# Patient Record
Sex: FEMALE | Race: WHITE | NOT HISPANIC OR LATINO | URBAN - METROPOLITAN AREA
[De-identification: names, ages, dates, MRNs, and addresses within clinical notes are randomized per-mention and may not be internally consistent; named-entity substitution may affect disease eponyms.]

---

## 2019-11-05 ENCOUNTER — OUTPATIENT (OUTPATIENT)
Dept: OUTPATIENT SERVICES | Facility: HOSPITAL | Age: 79
LOS: 1 days | Discharge: HOME | End: 2019-11-05
Payer: MEDICARE

## 2019-11-05 VITALS
DIASTOLIC BLOOD PRESSURE: 63 MMHG | SYSTOLIC BLOOD PRESSURE: 131 MMHG | WEIGHT: 167.99 LBS | OXYGEN SATURATION: 99 % | TEMPERATURE: 97 F | HEART RATE: 69 BPM | RESPIRATION RATE: 18 BRPM | HEIGHT: 66 IN

## 2019-11-05 DIAGNOSIS — Z01.818 ENCOUNTER FOR OTHER PREPROCEDURAL EXAMINATION: ICD-10-CM

## 2019-11-05 DIAGNOSIS — Z98.49 CATARACT EXTRACTION STATUS, UNSPECIFIED EYE: Chronic | ICD-10-CM

## 2019-11-05 DIAGNOSIS — M16.11 UNILATERAL PRIMARY OSTEOARTHRITIS, RIGHT HIP: ICD-10-CM

## 2019-11-05 DIAGNOSIS — M19.90 UNSPECIFIED OSTEOARTHRITIS, UNSPECIFIED SITE: ICD-10-CM

## 2019-11-05 LAB
ANION GAP SERPL CALC-SCNC: 8 MMOL/L — SIGNIFICANT CHANGE UP (ref 5–17)
APTT BLD: 30.1 SEC — SIGNIFICANT CHANGE UP (ref 27.5–36.3)
BASOPHILS # BLD AUTO: 0.04 K/UL — SIGNIFICANT CHANGE UP (ref 0–0.2)
BASOPHILS NFR BLD AUTO: 0.6 % — SIGNIFICANT CHANGE UP (ref 0–2)
BLD GP AB SCN SERPL QL: SIGNIFICANT CHANGE UP
BUN SERPL-MCNC: 16 MG/DL — SIGNIFICANT CHANGE UP (ref 7–23)
CALCIUM SERPL-MCNC: 9.3 MG/DL — SIGNIFICANT CHANGE UP (ref 8.5–10.1)
CHLORIDE SERPL-SCNC: 104 MMOL/L — SIGNIFICANT CHANGE UP (ref 96–108)
CO2 SERPL-SCNC: 28 MMOL/L — SIGNIFICANT CHANGE UP (ref 22–31)
CREAT SERPL-MCNC: 0.68 MG/DL — SIGNIFICANT CHANGE UP (ref 0.5–1.3)
EOSINOPHIL # BLD AUTO: 0.14 K/UL — SIGNIFICANT CHANGE UP (ref 0–0.5)
EOSINOPHIL NFR BLD AUTO: 2 % — SIGNIFICANT CHANGE UP (ref 0–6)
GLUCOSE SERPL-MCNC: 79 MG/DL — SIGNIFICANT CHANGE UP (ref 70–99)
HBA1C BLD-MCNC: 4.9 % — SIGNIFICANT CHANGE UP (ref 4–5.6)
HCT VFR BLD CALC: 39 % — SIGNIFICANT CHANGE UP (ref 34.5–45)
HGB BLD-MCNC: 12.8 G/DL — SIGNIFICANT CHANGE UP (ref 11.5–15.5)
IMM GRANULOCYTES NFR BLD AUTO: 0.3 % — SIGNIFICANT CHANGE UP (ref 0–1.5)
INR BLD: 0.95 RATIO — SIGNIFICANT CHANGE UP (ref 0.88–1.16)
LYMPHOCYTES # BLD AUTO: 1.96 K/UL — SIGNIFICANT CHANGE UP (ref 1–3.3)
LYMPHOCYTES # BLD AUTO: 28.5 % — SIGNIFICANT CHANGE UP (ref 13–44)
MCHC RBC-ENTMCNC: 32.8 GM/DL — SIGNIFICANT CHANGE UP (ref 32–36)
MCHC RBC-ENTMCNC: 34.3 PG — HIGH (ref 27–34)
MCV RBC AUTO: 104.6 FL — HIGH (ref 80–100)
MONOCYTES # BLD AUTO: 0.44 K/UL — SIGNIFICANT CHANGE UP (ref 0–0.9)
MONOCYTES NFR BLD AUTO: 6.4 % — SIGNIFICANT CHANGE UP (ref 2–14)
MRSA PCR RESULT.: SIGNIFICANT CHANGE UP
NEUTROPHILS # BLD AUTO: 4.27 K/UL — SIGNIFICANT CHANGE UP (ref 1.8–7.4)
NEUTROPHILS NFR BLD AUTO: 62.2 % — SIGNIFICANT CHANGE UP (ref 43–77)
NRBC # BLD: 0 /100 WBCS — SIGNIFICANT CHANGE UP (ref 0–0)
PLATELET # BLD AUTO: 241 K/UL — SIGNIFICANT CHANGE UP (ref 150–400)
POTASSIUM SERPL-MCNC: 3.9 MMOL/L — SIGNIFICANT CHANGE UP (ref 3.5–5.3)
POTASSIUM SERPL-SCNC: 3.9 MMOL/L — SIGNIFICANT CHANGE UP (ref 3.5–5.3)
PROTHROM AB SERPL-ACNC: 10.6 SEC — SIGNIFICANT CHANGE UP (ref 10–12.9)
RBC # BLD: 3.73 M/UL — LOW (ref 3.8–5.2)
RBC # FLD: 13.4 % — SIGNIFICANT CHANGE UP (ref 10.3–14.5)
S AUREUS DNA NOSE QL NAA+PROBE: SIGNIFICANT CHANGE UP
SODIUM SERPL-SCNC: 140 MMOL/L — SIGNIFICANT CHANGE UP (ref 135–145)
WBC # BLD: 6.87 K/UL — SIGNIFICANT CHANGE UP (ref 3.8–10.5)
WBC # FLD AUTO: 6.87 K/UL — SIGNIFICANT CHANGE UP (ref 3.8–10.5)

## 2019-11-05 PROCEDURE — 93010 ELECTROCARDIOGRAM REPORT: CPT

## 2019-11-05 NOTE — H&P PST ADULT - MUSCULOSKELETAL
details… detailed exam no joint swelling/no joint erythema/decreased ROM due to pain/normal strength

## 2019-11-05 NOTE — H&P PST ADULT - NSICDXPASTMEDICALHX_GEN_ALL_CORE_FT
PAST MEDICAL HISTORY:  Chronic obstructive pulmonary disease, unspecified COPD type     History of atrial flutter     Hypothyroidism

## 2019-11-05 NOTE — H&P PST ADULT - HISTORY OF PRESENT ILLNESS
79 yr old F with PMH COPD, a flutter, resolved and hypothyroidism with R hip pain for several years, here for PST for R posterior total hip replacement.

## 2019-11-05 NOTE — PHYSICAL THERAPY INITIAL EVALUATION ADULT - MODIFIED CLINICAL TEST OF SENSORY INTEGRATION IN BALANCE TEST
5x sit to stand = 26.27 seconds. 2MWT  200ft without device (decreased step length, decreased miles, decreased arm swing). Stairs = step to step with R rail for ascent

## 2019-11-05 NOTE — PHYSICAL THERAPY INITIAL EVALUATION ADULT - PERTINENT HX OF CURRENT PROBLEM, REHAB EVAL
Pt is a 80yo F whom presents for pre-op assessment due to scheduled R TERRI (posterior approach) on 11/19

## 2019-11-05 NOTE — PHYSICAL THERAPY INITIAL EVALUATION ADULT - ADDITIONAL COMMENTS
Pt lives with her spouse (whom works but can assist after work) in a private home, 3 entry steps (+ R rail to ascend), 7 steps (R rail), landing, then additional 7 steps (R rail) to 2nd level. Pt has a tub/shower combo, no grab bars, fixed shower head, & standard toilet seat height. Pt states 3:1 commode can fit over toilet in bathroom. Pt performs all functional mobility & ADL's independently. Pt is a community ambulator. Pt denies owning any DMEs. Pt states she has no pain at rest & states pain is worse with walking (unable to use numerical pain rating scale). Pt attends outpatient pulmonary rehab 2x/week for COPD. Pt denies any home O2. Pt prefers to be called "Ermias." Pt wears eye glasses for reading & distance, drives, is right hand dominant, & works part time in real estate. Goal of therapy: improve functional mobility.

## 2019-11-18 ENCOUNTER — TRANSCRIPTION ENCOUNTER (OUTPATIENT)
Age: 79
End: 2019-11-18

## 2019-11-18 RX ORDER — OXYCODONE HYDROCHLORIDE 5 MG/1
5 TABLET ORAL EVERY 4 HOURS
Refills: 0 | Status: DISCONTINUED | OUTPATIENT
Start: 2019-11-19 | End: 2019-11-21

## 2019-11-18 RX ORDER — ASPIRIN/CALCIUM CARB/MAGNESIUM 324 MG
325 TABLET ORAL
Refills: 0 | Status: DISCONTINUED | OUTPATIENT
Start: 2019-11-20 | End: 2019-11-21

## 2019-11-18 RX ORDER — MAGNESIUM HYDROXIDE 400 MG/1
30 TABLET, CHEWABLE ORAL DAILY
Refills: 0 | Status: DISCONTINUED | OUTPATIENT
Start: 2019-11-19 | End: 2019-11-21

## 2019-11-18 RX ORDER — SODIUM CHLORIDE 9 MG/ML
1000 INJECTION INTRAMUSCULAR; INTRAVENOUS; SUBCUTANEOUS
Refills: 0 | Status: DISCONTINUED | OUTPATIENT
Start: 2019-11-19 | End: 2019-11-21

## 2019-11-18 RX ORDER — CELECOXIB 200 MG/1
200 CAPSULE ORAL DAILY
Refills: 0 | Status: DISCONTINUED | OUTPATIENT
Start: 2019-11-20 | End: 2019-11-21

## 2019-11-18 RX ORDER — PANTOPRAZOLE SODIUM 20 MG/1
40 TABLET, DELAYED RELEASE ORAL
Refills: 0 | Status: DISCONTINUED | OUTPATIENT
Start: 2019-11-19 | End: 2019-11-21

## 2019-11-18 RX ORDER — ACETAMINOPHEN 500 MG
975 TABLET ORAL EVERY 8 HOURS
Refills: 0 | Status: DISCONTINUED | OUTPATIENT
Start: 2019-11-19 | End: 2019-11-21

## 2019-11-18 RX ORDER — FERROUS SULFATE 325(65) MG
325 TABLET ORAL
Refills: 0 | Status: DISCONTINUED | OUTPATIENT
Start: 2019-11-19 | End: 2019-11-21

## 2019-11-18 RX ORDER — FOLIC ACID 0.8 MG
1 TABLET ORAL DAILY
Refills: 0 | Status: DISCONTINUED | OUTPATIENT
Start: 2019-11-19 | End: 2019-11-21

## 2019-11-18 RX ORDER — OXYCODONE HYDROCHLORIDE 5 MG/1
10 TABLET ORAL EVERY 4 HOURS
Refills: 0 | Status: DISCONTINUED | OUTPATIENT
Start: 2019-11-19 | End: 2019-11-21

## 2019-11-18 RX ORDER — POLYETHYLENE GLYCOL 3350 17 G/17G
17 POWDER, FOR SOLUTION ORAL DAILY
Refills: 0 | Status: DISCONTINUED | OUTPATIENT
Start: 2019-11-19 | End: 2019-11-21

## 2019-11-18 RX ORDER — HYDROMORPHONE HYDROCHLORIDE 2 MG/ML
0.5 INJECTION INTRAMUSCULAR; INTRAVENOUS; SUBCUTANEOUS EVERY 4 HOURS
Refills: 0 | Status: DISCONTINUED | OUTPATIENT
Start: 2019-11-19 | End: 2019-11-21

## 2019-11-18 RX ORDER — ONDANSETRON 8 MG/1
4 TABLET, FILM COATED ORAL EVERY 6 HOURS
Refills: 0 | Status: DISCONTINUED | OUTPATIENT
Start: 2019-11-19 | End: 2019-11-21

## 2019-11-18 RX ORDER — SENNA PLUS 8.6 MG/1
2 TABLET ORAL AT BEDTIME
Refills: 0 | Status: DISCONTINUED | OUTPATIENT
Start: 2019-11-19 | End: 2019-11-21

## 2019-11-19 ENCOUNTER — RESULT REVIEW (OUTPATIENT)
Age: 79
End: 2019-11-19

## 2019-11-19 ENCOUNTER — INPATIENT (INPATIENT)
Facility: HOSPITAL | Age: 79
LOS: 1 days | Discharge: HOME HEALTH | End: 2019-11-21
Attending: ORTHOPAEDIC SURGERY | Admitting: ORTHOPAEDIC SURGERY
Payer: MEDICARE

## 2019-11-19 ENCOUNTER — TRANSCRIPTION ENCOUNTER (OUTPATIENT)
Age: 79
End: 2019-11-19

## 2019-11-19 VITALS
OXYGEN SATURATION: 98 % | RESPIRATION RATE: 18 BRPM | TEMPERATURE: 97 F | DIASTOLIC BLOOD PRESSURE: 48 MMHG | HEART RATE: 76 BPM | WEIGHT: 164.91 LBS | HEIGHT: 66 IN | SYSTOLIC BLOOD PRESSURE: 134 MMHG

## 2019-11-19 DIAGNOSIS — Z98.49 CATARACT EXTRACTION STATUS, UNSPECIFIED EYE: Chronic | ICD-10-CM

## 2019-11-19 LAB
HCT VFR BLD CALC: 37.6 % — SIGNIFICANT CHANGE UP (ref 34.5–45)
HGB BLD-MCNC: 12.2 G/DL — SIGNIFICANT CHANGE UP (ref 11.5–15.5)
MCHC RBC-ENTMCNC: 32.4 GM/DL — SIGNIFICANT CHANGE UP (ref 32–36)
MCHC RBC-ENTMCNC: 34.7 PG — HIGH (ref 27–34)
MCV RBC AUTO: 106.8 FL — HIGH (ref 80–100)
NRBC # BLD: 0 /100 WBCS — SIGNIFICANT CHANGE UP (ref 0–0)
PLATELET # BLD AUTO: 193 K/UL — SIGNIFICANT CHANGE UP (ref 150–400)
RBC # BLD: 3.52 M/UL — LOW (ref 3.8–5.2)
RBC # FLD: 13.3 % — SIGNIFICANT CHANGE UP (ref 10.3–14.5)
WBC # BLD: 7.1 K/UL — SIGNIFICANT CHANGE UP (ref 3.8–10.5)
WBC # FLD AUTO: 7.1 K/UL — SIGNIFICANT CHANGE UP (ref 3.8–10.5)

## 2019-11-19 PROCEDURE — 72170 X-RAY EXAM OF PELVIS: CPT | Mod: 26,77

## 2019-11-19 PROCEDURE — 88311 DECALCIFY TISSUE: CPT | Mod: 26

## 2019-11-19 PROCEDURE — 72170 X-RAY EXAM OF PELVIS: CPT | Mod: 26

## 2019-11-19 PROCEDURE — 88305 TISSUE EXAM BY PATHOLOGIST: CPT | Mod: 26

## 2019-11-19 RX ORDER — LEVOTHYROXINE SODIUM 125 MCG
25 TABLET ORAL DAILY
Refills: 0 | Status: DISCONTINUED | OUTPATIENT
Start: 2019-11-19 | End: 2019-11-21

## 2019-11-19 RX ORDER — LANOLIN ALCOHOL/MO/W.PET/CERES
3 CREAM (GRAM) TOPICAL AT BEDTIME
Refills: 0 | Status: DISCONTINUED | OUTPATIENT
Start: 2019-11-19 | End: 2019-11-21

## 2019-11-19 RX ORDER — ONDANSETRON 8 MG/1
4 TABLET, FILM COATED ORAL ONCE
Refills: 0 | Status: DISCONTINUED | OUTPATIENT
Start: 2019-11-19 | End: 2019-11-19

## 2019-11-19 RX ORDER — ATORVASTATIN CALCIUM 80 MG/1
10 TABLET, FILM COATED ORAL AT BEDTIME
Refills: 0 | Status: DISCONTINUED | OUTPATIENT
Start: 2019-11-19 | End: 2019-11-21

## 2019-11-19 RX ORDER — CEFAZOLIN SODIUM 1 G
2000 VIAL (EA) INJECTION EVERY 8 HOURS
Refills: 0 | Status: COMPLETED | OUTPATIENT
Start: 2019-11-19 | End: 2019-11-20

## 2019-11-19 RX ORDER — OXYCODONE HYDROCHLORIDE 5 MG/1
10 TABLET ORAL ONCE
Refills: 0 | Status: DISCONTINUED | OUTPATIENT
Start: 2019-11-19 | End: 2019-11-19

## 2019-11-19 RX ORDER — DILTIAZEM HCL 120 MG
120 CAPSULE, EXT RELEASE 24 HR ORAL DAILY
Refills: 0 | Status: DISCONTINUED | OUTPATIENT
Start: 2019-11-19 | End: 2019-11-21

## 2019-11-19 RX ORDER — HYDROMORPHONE HYDROCHLORIDE 2 MG/ML
1 INJECTION INTRAMUSCULAR; INTRAVENOUS; SUBCUTANEOUS
Refills: 0 | Status: DISCONTINUED | OUTPATIENT
Start: 2019-11-19 | End: 2019-11-19

## 2019-11-19 RX ORDER — ACETAMINOPHEN 500 MG
650 TABLET ORAL ONCE
Refills: 0 | Status: COMPLETED | OUTPATIENT
Start: 2019-11-19 | End: 2019-11-19

## 2019-11-19 RX ORDER — SODIUM CHLORIDE 9 MG/ML
1000 INJECTION, SOLUTION INTRAVENOUS
Refills: 0 | Status: DISCONTINUED | OUTPATIENT
Start: 2019-11-19 | End: 2019-11-19

## 2019-11-19 RX ORDER — DEXAMETHASONE 0.5 MG/5ML
10 ELIXIR ORAL ONCE
Refills: 0 | Status: COMPLETED | OUTPATIENT
Start: 2019-11-20 | End: 2019-11-20

## 2019-11-19 RX ORDER — FLUTICASONE PROPIONATE 50 MCG
2 SPRAY, SUSPENSION NASAL DAILY
Refills: 0 | Status: DISCONTINUED | OUTPATIENT
Start: 2019-11-19 | End: 2019-11-21

## 2019-11-19 RX ORDER — HYDROMORPHONE HYDROCHLORIDE 2 MG/ML
0.5 INJECTION INTRAMUSCULAR; INTRAVENOUS; SUBCUTANEOUS
Refills: 0 | Status: DISCONTINUED | OUTPATIENT
Start: 2019-11-19 | End: 2019-11-19

## 2019-11-19 RX ORDER — TIOTROPIUM BROMIDE 18 UG/1
1 CAPSULE ORAL; RESPIRATORY (INHALATION) DAILY
Refills: 0 | Status: DISCONTINUED | OUTPATIENT
Start: 2019-11-19 | End: 2019-11-21

## 2019-11-19 RX ORDER — CELECOXIB 200 MG/1
200 CAPSULE ORAL ONCE
Refills: 0 | Status: COMPLETED | OUTPATIENT
Start: 2019-11-19 | End: 2019-11-19

## 2019-11-19 RX ADMIN — Medication 650 MILLIGRAM(S): at 11:00

## 2019-11-19 RX ADMIN — Medication 325 MILLIGRAM(S): at 18:55

## 2019-11-19 RX ADMIN — Medication 975 MILLIGRAM(S): at 23:20

## 2019-11-19 RX ADMIN — CELECOXIB 200 MILLIGRAM(S): 200 CAPSULE ORAL at 11:00

## 2019-11-19 RX ADMIN — Medication 975 MILLIGRAM(S): at 22:23

## 2019-11-19 RX ADMIN — ATORVASTATIN CALCIUM 10 MILLIGRAM(S): 80 TABLET, FILM COATED ORAL at 22:23

## 2019-11-19 RX ADMIN — SODIUM CHLORIDE 100 MILLILITER(S): 9 INJECTION INTRAMUSCULAR; INTRAVENOUS; SUBCUTANEOUS at 18:30

## 2019-11-19 RX ADMIN — Medication 100 MILLIGRAM(S): at 21:12

## 2019-11-19 NOTE — PROGRESS NOTE ADULT - SUBJECTIVE AND OBJECTIVE BOX
79yFemale s/p R TERRI POD#0. Pt seen and examined in NAD. Pain controlled. Pt denies any new complaints. Pt denies CP/SOB/N/V/D/numbness/tingling/bowel or bladder dysfunction.     PE:   RLE: dressing c/d/i. +ROM ankle/toes. Calf: soft, compressible and nontender. DP/PT 2+ NVI.                           12.2   7.10  )-----------( 193      ( 19 Nov 2019 16:14 )             37.6           A/P: 79yFemale s/p R TERRI POD#0  Pain controlled  Total hip precautions  PT: WBAT    DVT ppx: SCDs/ASA 325mg BID    Wound care, Isometric exercises, incentive spirometry   Discharge: planning for home   All the above discussed and understood by pt

## 2019-11-19 NOTE — DISCHARGE NOTE PROVIDER - CARE PROVIDER_API CALL
Antelmo Haynes)  Orthopaedic Surgery  [address] [place] [address]  Phone: [phone]  Fax: [phone]  Follow Up Time:

## 2019-11-19 NOTE — BRIEF OPERATIVE NOTE - NSICDXBRIEFPREOP_GEN_ALL_CORE_FT
PRE-OP DIAGNOSIS:  Primary localized osteoarthrosis of right hip 19-Nov-2019 15:00:51  Anjel Skinner

## 2019-11-19 NOTE — PHYSICAL THERAPY INITIAL EVALUATION ADULT - CRITERIA FOR SKILLED THERAPEUTIC INTERVENTIONS
rehab potential/anticipated equipment needs at discharge/anticipated discharge recommendation/impairments found/therapy frequency/functional limitations in following categories/predicted duration of therapy intervention/risk reduction/prevention

## 2019-11-19 NOTE — PHYSICAL THERAPY INITIAL EVALUATION ADULT - ADDITIONAL COMMENTS
Preop, confirmed and verified: Pt lives with her spouse (whom works but can assist after work) in a private home, 3 entry steps (+ R rail to ascend), 7 steps (R rail), landing, then additional 7 steps (R rail) to 2nd level. Pt has a tub/shower combo, no grab bars, fixed shower head, & standard toilet seat height. pt was independent during functional with no AD, drives.

## 2019-11-19 NOTE — DISCHARGE NOTE PROVIDER - HOSPITAL COURSE
79yFemale with history of OA presenting for R TERRI by Dr. Haynes on 11/19/19. Risk and benefits of surgery were explained to the patient. The patient understood and agreed to proceed with surgery. Patient underwent the procedure with no intraoperative complications. Pt was brought in stable condition to the PACU. Once stable in PACU, pt was brought to the floor. During hospital stay pt was followed by Medicine, physical therapy, Home Care during this admission. Pt had an uneventful hospital course. Pt is stable for discharge to home 79yFemale with history of OA presenting for R TERRI by Dr. Haynes on 11/19/19. Risk and benefits of surgery were explained to the patient. The patient understood and agreed to proceed with surgery. Patient underwent the procedure with no intraoperative complications. Pt was brought in stable condition to the PACU. Once stable in PACU, pt was brought to the floor. During hospital stay pt was followed by Medicine, physical therapy, Home Care during this admission. Pt had nausea and vomiting and required continued obeservation. She had an abdominal Xray done and it showed stool. She was given laxatives. She tolerated solid food on POD#2. Pt is stable for discharge to home on POD#2

## 2019-11-19 NOTE — PHYSICAL THERAPY INITIAL EVALUATION ADULT - GAIT TRAINING, PT EVAL
Independent in ambulation with use of cane device up to 250 feet observing proper gait pattern, posture and use of walking device safely.

## 2019-11-19 NOTE — CONSULT NOTE ADULT - SUBJECTIVE AND OBJECTIVE BOX
WHITNEY DICKINSON is a 79y Female s/p RIGHT POSTERIOR TOTAL HIP REPLACEMENT   by Dr. Haynes on 11/19/19. complains of postop pain; patient tolerated surgery well.     PMH:  w/ h/o Hypothyroidism  History of atrial flutter  Chronic obstructive pulmonary disease, unspecified COPD type    ROS: no fevers, chills, headache, dizziness, lightheadedness, chest pain, palpitations, shortness of breath, cough, phlegm, wheezing, abdominal pain, nausea, vomiting, diarrhea, constipation or urinary symptoms     PSH: H/O cataract extraction    No pertinent family history in first degree relatives    SH: does not smoke or drink at this time    No Known Allergies    MEDS: acetaminophen   Tablet .. 975 milliGRAM(s) Oral every 8 hours  aluminum hydroxide/magnesium hydroxide/simethicone Suspension 30 milliLiter(s) Oral four times a day PRN  atorvastatin 10 milliGRAM(s) Oral at bedtime  ceFAZolin   IVPB 2000 milliGRAM(s) IV Intermittent every 8 hours  diltiazem    milliGRAM(s) Oral daily  ferrous    sulfate 325 milliGRAM(s) Oral three times a day with meals  fluticasone propionate 50 MICROgram(s)/spray Nasal Spray 2 Spray(s) Both Nostrils daily  folic acid 1 milliGRAM(s) Oral daily  HYDROmorphone  Injectable 0.5 milliGRAM(s) IV Push every 4 hours PRN  HYDROmorphone  Injectable 0.5 milliGRAM(s) IV Push every 10 minutes PRN  HYDROmorphone  Injectable 1 milliGRAM(s) IV Push every 10 minutes PRN  lactated ringers. 1000 milliLiter(s) IV Continuous <Continuous>  levothyroxine 25 MICROGram(s) Oral daily  magnesium hydroxide Suspension 30 milliLiter(s) Oral daily PRN  multivitamin 1 Tablet(s) Oral daily  ondansetron Injectable 4 milliGRAM(s) IV Push every 6 hours PRN  ondansetron Injectable 4 milliGRAM(s) IV Push once PRN  oxyCODONE    IR 5 milliGRAM(s) Oral every 4 hours PRN  oxyCODONE    IR 10 milliGRAM(s) Oral every 4 hours PRN  pantoprazole    Tablet 40 milliGRAM(s) Oral before breakfast  polyethylene glycol 3350 17 Gram(s) Oral daily  senna 2 Tablet(s) Oral at bedtime PRN  sodium chloride 0.9%. 1000 milliLiter(s) IV Continuous <Continuous>  tiotropium 18 MICROgram(s) Capsule 1 Capsule(s) Inhalation daily    PHYS:  T(C): 36.4 (11-19-19 @ 18:05), Max: 36.4 (11-19-19 @ 18:05)  HR: 50 (11-19-19 @ 18:05) (41 - 76)  BP: 149/78 (11-19-19 @ 18:05) (94/62 - 149/78)  RR: 18 (11-19-19 @ 18:05) (15 - 18)  SpO2: 100% (11-19-19 @ 18:05) (97% - 100%)  HEENT unremarkable  neck no JVD or bruit  lungs, clear bilaterally  heart, regular rhythm, normal S1, S2, no murmurs, rubs or gallops  abdomen, soft, non tender, no organomegaly, normal bowel sounds  no cyanosis, clubbing, edema or calf tenderness  neuro, grossly normal                        12.2   7.10  )-----------( 193      ( 19 Nov 2019 16:14 )             37.6     Assessment and Plan: status post right total hip replacement; anemia; chronic obstructive pulmonary disease; hypothyroid; history of atrial flutter; pain control; deep vein thrombophlebitis prophylaxis; physical therapy; bowel regimen; nutrition support; follow up labs; will follow.

## 2019-11-19 NOTE — DISCHARGE NOTE PROVIDER - NSDCFUADDAPPT_GEN_ALL_CORE_FT
Follow up with Dr. Haynes in 2 weeks. Please call [phone] for appointment.      Follow up with your primary care doctor within 1 week to monitor your blood work. Please call to make an appointment.    Please call your surgeon, if you have new onset of fevers, increased drainage, increased pain or increased redness around the incision site. Please return to the Emergency Department if you have chest pain or shortness of breath.

## 2019-11-19 NOTE — PHYSICAL THERAPY INITIAL EVALUATION ADULT - RANGE OF MOTION EXAMINATION, REHAB EVAL
deficits as listed below/Left LE ROM was WNL (within normal limits)/R LE limited to 90 degrees flexion. no IR.

## 2019-11-19 NOTE — DISCHARGE NOTE PROVIDER - NSDCMRMEDTOKEN_GEN_ALL_CORE_FT
Breo Ellipta 100 mcg-25 mcg/inh inhalation powder: 1 puff(s) inhaled once a day  DilTIAZem Hydrochloride  mg/24 hours oral capsule, extended release: 1 cap(s) orally once a day  levothyroxine 25 mcg (0.025 mg) oral tablet: 1 tab(s) orally once a day  Nasonex 50 mcg/inh nasal spray: 2 spray(s) nasal once a day  NP Thyroid 30 mg oral tablet: 1 tab(s) orally 2 times a day  pravastatin 10 mg oral tablet: 1 tab(s) orally once a day  Spiriva 18 mcg inhalation capsule: 1 cap(s) inhaled once a day acetaminophen 325 mg oral tablet: 3 tab(s) orally every 8 hours as needed for pain   aspirin 325 mg oral delayed release tablet: 1 tab(s) orally every 12 hours to prevent blood clots MDD:2 tablet  bisacodyl 10 mg rectal suppository: 1 suppository(ies) rectal once a day, As needed, If no bowel movement by POD#2  Breo Ellipta 100 mcg-25 mcg/inh inhalation powder: 1 puff(s) inhaled once a day  celecoxib 200 mg oral capsule: 1 cap(s) orally once a day MDD:1 capsule  DilTIAZem Hydrochloride  mg/24 hours oral capsule, extended release: 1 cap(s) orally once a day  levothyroxine 25 mcg (0.025 mg) oral tablet: 1 tab(s) orally once a day  Multiple Vitamins oral tablet: 1 tab(s) orally once a day  Nasonex 50 mcg/inh nasal spray: 2 spray(s) nasal once a day  NP Thyroid 30 mg oral tablet: 1 tab(s) orally 2 times a day  pantoprazole 40 mg oral delayed release tablet: 1 tab(s) orally once a day (before a meal) MDD:1 tablet  polyethylene glycol 3350 oral powder for reconstitution: 17 gram(s) orally once a day  pravastatin 10 mg oral tablet: 1 tab(s) orally once a day  Spiriva 18 mcg inhalation capsule: 1 cap(s) inhaled once a day acetaminophen 325 mg oral tablet: 3 tab(s) orally every 8 hours as needed for pain   aspirin 325 mg oral delayed release tablet: 1 tab(s) orally every 12 hours to prevent blood clots MDD:2 tablet  bisacodyl 10 mg rectal suppository: 1 suppository(ies) rectal once a day, As needed, If no bowel movement by POD#2  Breo Ellipta 100 mcg-25 mcg/inh inhalation powder: 1 puff(s) inhaled once a day  celecoxib 200 mg oral capsule: 1 cap(s) orally once a day MDD:1 capsule  DilTIAZem Hydrochloride  mg/24 hours oral capsule, extended release: 1 cap(s) orally once a day  levothyroxine 25 mcg (0.025 mg) oral tablet: 1 tab(s) orally once a day  magnesium hydroxide 8% oral suspension: 30 milliliter(s) orally once a day, As needed, Constipation  Multiple Vitamins oral tablet: 1 tab(s) orally once a day  Nasonex 50 mcg/inh nasal spray: 2 spray(s) nasal once a day  NP Thyroid 30 mg oral tablet: 1 tab(s) orally 2 times a day  pantoprazole 40 mg oral delayed release tablet: 1 tab(s) orally once a day (before a meal) MDD:1 tablet  polyethylene glycol 3350 oral powder for reconstitution: 17 gram(s) orally once a day  pravastatin 10 mg oral tablet: 1 tab(s) orally once a day  Spiriva 18 mcg inhalation capsule: 1 cap(s) inhaled once a day

## 2019-11-19 NOTE — DISCHARGE NOTE PROVIDER - NSDCACTIVITY_GEN_ALL_CORE
Walking - Outdoors allowed/Do not drive or operate machinery/Stairs allowed/Showering allowed/Do not make important decisions/Walking - Indoors allowed/No heavy lifting/straining

## 2019-11-20 ENCOUNTER — TRANSCRIPTION ENCOUNTER (OUTPATIENT)
Age: 79
End: 2019-11-20

## 2019-11-20 LAB
ANION GAP SERPL CALC-SCNC: 6 MMOL/L — SIGNIFICANT CHANGE UP (ref 5–17)
BUN SERPL-MCNC: 22 MG/DL — SIGNIFICANT CHANGE UP (ref 7–23)
CALCIUM SERPL-MCNC: 8.6 MG/DL — SIGNIFICANT CHANGE UP (ref 8.5–10.1)
CHLORIDE SERPL-SCNC: 110 MMOL/L — HIGH (ref 96–108)
CO2 SERPL-SCNC: 24 MMOL/L — SIGNIFICANT CHANGE UP (ref 22–31)
CREAT SERPL-MCNC: 0.8 MG/DL — SIGNIFICANT CHANGE UP (ref 0.5–1.3)
GLUCOSE SERPL-MCNC: 132 MG/DL — HIGH (ref 70–99)
HCT VFR BLD CALC: 34.9 % — SIGNIFICANT CHANGE UP (ref 34.5–45)
HGB BLD-MCNC: 11.5 G/DL — SIGNIFICANT CHANGE UP (ref 11.5–15.5)
MCHC RBC-ENTMCNC: 33 GM/DL — SIGNIFICANT CHANGE UP (ref 32–36)
MCHC RBC-ENTMCNC: 34.4 PG — HIGH (ref 27–34)
MCV RBC AUTO: 104.5 FL — HIGH (ref 80–100)
NRBC # BLD: 0 /100 WBCS — SIGNIFICANT CHANGE UP (ref 0–0)
PLATELET # BLD AUTO: 192 K/UL — SIGNIFICANT CHANGE UP (ref 150–400)
POTASSIUM SERPL-MCNC: 4.1 MMOL/L — SIGNIFICANT CHANGE UP (ref 3.5–5.3)
POTASSIUM SERPL-SCNC: 4.1 MMOL/L — SIGNIFICANT CHANGE UP (ref 3.5–5.3)
RBC # BLD: 3.34 M/UL — LOW (ref 3.8–5.2)
RBC # FLD: 12.9 % — SIGNIFICANT CHANGE UP (ref 10.3–14.5)
SODIUM SERPL-SCNC: 140 MMOL/L — SIGNIFICANT CHANGE UP (ref 135–145)
WBC # BLD: 9.92 K/UL — SIGNIFICANT CHANGE UP (ref 3.8–10.5)
WBC # FLD AUTO: 9.92 K/UL — SIGNIFICANT CHANGE UP (ref 3.8–10.5)

## 2019-11-20 RX ORDER — SIMETHICONE 80 MG/1
80 TABLET, CHEWABLE ORAL EVERY 6 HOURS
Refills: 0 | Status: COMPLETED | OUTPATIENT
Start: 2019-11-20 | End: 2019-11-21

## 2019-11-20 RX ORDER — ACETAMINOPHEN 500 MG
3 TABLET ORAL
Qty: 0 | Refills: 0 | DISCHARGE
Start: 2019-11-20

## 2019-11-20 RX ORDER — POLYETHYLENE GLYCOL 3350 17 G/17G
17 POWDER, FOR SOLUTION ORAL
Qty: 0 | Refills: 0 | DISCHARGE
Start: 2019-11-20

## 2019-11-20 RX ORDER — CELECOXIB 200 MG/1
1 CAPSULE ORAL
Qty: 30 | Refills: 0
Start: 2019-11-20 | End: 2019-12-19

## 2019-11-20 RX ORDER — METOCLOPRAMIDE HCL 10 MG
10 TABLET ORAL ONCE
Refills: 0 | Status: COMPLETED | OUTPATIENT
Start: 2019-11-20 | End: 2019-11-20

## 2019-11-20 RX ORDER — ASPIRIN/CALCIUM CARB/MAGNESIUM 324 MG
1 TABLET ORAL
Qty: 60 | Refills: 0
Start: 2019-11-20 | End: 2019-12-19

## 2019-11-20 RX ORDER — SODIUM CHLORIDE 9 MG/ML
1000 INJECTION, SOLUTION INTRAVENOUS
Refills: 0 | Status: COMPLETED | OUTPATIENT
Start: 2019-11-20 | End: 2019-11-20

## 2019-11-20 RX ORDER — PANTOPRAZOLE SODIUM 20 MG/1
1 TABLET, DELAYED RELEASE ORAL
Qty: 30 | Refills: 0
Start: 2019-11-20 | End: 2019-12-19

## 2019-11-20 RX ADMIN — ATORVASTATIN CALCIUM 10 MILLIGRAM(S): 80 TABLET, FILM COATED ORAL at 21:55

## 2019-11-20 RX ADMIN — Medication 10 MILLIGRAM(S): at 17:17

## 2019-11-20 RX ADMIN — TIOTROPIUM BROMIDE 1 CAPSULE(S): 18 CAPSULE ORAL; RESPIRATORY (INHALATION) at 14:36

## 2019-11-20 RX ADMIN — ONDANSETRON 4 MILLIGRAM(S): 8 TABLET, FILM COATED ORAL at 08:06

## 2019-11-20 RX ADMIN — Medication 325 MILLIGRAM(S): at 17:18

## 2019-11-20 RX ADMIN — POLYETHYLENE GLYCOL 3350 17 GRAM(S): 17 POWDER, FOR SOLUTION ORAL at 12:51

## 2019-11-20 RX ADMIN — Medication 2 SPRAY(S): at 14:36

## 2019-11-20 RX ADMIN — Medication 325 MILLIGRAM(S): at 06:33

## 2019-11-20 RX ADMIN — Medication 975 MILLIGRAM(S): at 14:37

## 2019-11-20 RX ADMIN — SODIUM CHLORIDE 100 MILLILITER(S): 9 INJECTION INTRAMUSCULAR; INTRAVENOUS; SUBCUTANEOUS at 05:44

## 2019-11-20 RX ADMIN — Medication 102 MILLIGRAM(S): at 06:32

## 2019-11-20 RX ADMIN — Medication 25 MICROGRAM(S): at 06:33

## 2019-11-20 RX ADMIN — Medication 325 MILLIGRAM(S): at 12:51

## 2019-11-20 RX ADMIN — CELECOXIB 200 MILLIGRAM(S): 200 CAPSULE ORAL at 12:53

## 2019-11-20 RX ADMIN — Medication 1 TABLET(S): at 12:50

## 2019-11-20 RX ADMIN — Medication 3 MILLIGRAM(S): at 00:22

## 2019-11-20 RX ADMIN — Medication 120 MILLIGRAM(S): at 12:50

## 2019-11-20 RX ADMIN — CELECOXIB 200 MILLIGRAM(S): 200 CAPSULE ORAL at 13:31

## 2019-11-20 RX ADMIN — Medication 975 MILLIGRAM(S): at 06:33

## 2019-11-20 RX ADMIN — Medication 100 MILLIGRAM(S): at 05:45

## 2019-11-20 RX ADMIN — Medication 975 MILLIGRAM(S): at 07:30

## 2019-11-20 RX ADMIN — SIMETHICONE 80 MILLIGRAM(S): 80 TABLET, CHEWABLE ORAL at 17:17

## 2019-11-20 RX ADMIN — Medication 325 MILLIGRAM(S): at 08:04

## 2019-11-20 RX ADMIN — Medication 325 MILLIGRAM(S): at 17:39

## 2019-11-20 RX ADMIN — Medication 975 MILLIGRAM(S): at 21:56

## 2019-11-20 RX ADMIN — SODIUM CHLORIDE 500 MILLILITER(S): 9 INJECTION, SOLUTION INTRAVENOUS at 12:37

## 2019-11-20 RX ADMIN — Medication 975 MILLIGRAM(S): at 22:56

## 2019-11-20 RX ADMIN — PANTOPRAZOLE SODIUM 40 MILLIGRAM(S): 20 TABLET, DELAYED RELEASE ORAL at 08:04

## 2019-11-20 RX ADMIN — Medication 1 MILLIGRAM(S): at 12:50

## 2019-11-20 RX ADMIN — SIMETHICONE 80 MILLIGRAM(S): 80 TABLET, CHEWABLE ORAL at 21:57

## 2019-11-20 RX ADMIN — Medication 975 MILLIGRAM(S): at 15:10

## 2019-11-20 RX ADMIN — Medication 3 MILLIGRAM(S): at 21:56

## 2019-11-20 NOTE — OCCUPATIONAL THERAPY INITIAL EVALUATION ADULT - SOCIAL CONCERNS
Pt voiced concerns about her recovery at home. Pt has her spouse to assist her after discharge home/Complex psychosocial needs/coping issues

## 2019-11-20 NOTE — PROGRESS NOTE ADULT - SUBJECTIVE AND OBJECTIVE BOX
79yFemale s/p R TERRI POD#1. Pt seen and examined in NAD. Pain controlled. Pt denies any new complaints. Pt denies CP/SOB/N/V/D/numbness/tingling/bowel or bladder dysfunction.     PE:   RLE: dressing c/d/i. +ROM ankle/toes. Calf: soft, compressible and nontender. DP/PT 2+ NVI.                           11.5   9.92  )-----------( 192      ( 20 Nov 2019 06:34 )             34.9       11-20    140  |  110<H>  |  22  ----------------------------<  132<H>  4.1   |  24  |  0.80    Ca    8.6      20 Nov 2019 06:34          A/P: 79yFemale s/p R TERRI POD#1  Pain controlled  Total hip precautions  PT: WBAT  DVT ppx: SCDs/ASA 325mg BID    Wound care, Isometric exercises, incentive spirometry   Discharge: planning for home   All the above discussed and understood by pt

## 2019-11-20 NOTE — OCCUPATIONAL THERAPY INITIAL EVALUATION ADULT - PARENT CAREGIVER TRAINING, OT EVAL
Pt will state all posterior hip precautions and safely incorporate them with ADL and  functional mobility .

## 2019-11-20 NOTE — OCCUPATIONAL THERAPY INITIAL EVALUATION ADULT - GENERAL OBSERVATIONS, REHAB EVAL
Pt was seen for initial OT consult, encountered in NAD. IV was disconnected by JAVAN Mcgee. Posterioir THP were reviewed & maintained. Pt was AA&Ox4, cooperative & followed commands. Pt denied pain despite  to s/p THR. Pt presents with decreased activity tolerance ,balance, ADL management, ROM, muscle strength and functional mobility.

## 2019-11-20 NOTE — OCCUPATIONAL THERAPY INITIAL EVALUATION ADULT - PERSONAL SAFETY AND JUDGMENT, REHAB EVAL
intact/however, pt needs reminders to safely incorporate THP with ADL., functional mobility and to avoid internal rotation of right hip during turns.

## 2019-11-20 NOTE — OCCUPATIONAL THERAPY INITIAL EVALUATION ADULT - LIVES WITH, PROFILE
spouse/in a private house with 3 entry steps equipped with right hand rail. Once inside, pt has to negotiate a flight of stairs  ( 7 steps + a landing and another 7 steps with right hand rail) to access the bedroom and bathroom. The bathroom has a tub/shower combination, fixed shower head and standard toilet.

## 2019-11-20 NOTE — OCCUPATIONAL THERAPY INITIAL EVALUATION ADULT - ANTICIPATED DISCHARGE DISPOSITION, OT EVAL
Recommend home with OT referral to enable patient to safely perform ADL management and functional mobility. Pt conformed she has received a h 3-in-1 commode and rolling walker.

## 2019-11-20 NOTE — DISCHARGE NOTE NURSING/CASE MANAGEMENT/SOCIAL WORK - PATIENT PORTAL LINK FT
You can access the FollowMyHealth Patient Portal offered by [facility] by registering at the following website: [email or web address]. By joining [facility]’s FollowMyHealth portal, you will also be able to view your health information using other applications (apps) compatible with our system.

## 2019-11-20 NOTE — OCCUPATIONAL THERAPY INITIAL EVALUATION ADULT - ADL RETRAINING, OT EVAL
Pt will perform all aspects of lower body self care tasks independently with use of adaptive device /set up within 1 week while adhering to all posterior hip precautions .

## 2019-11-20 NOTE — OCCUPATIONAL THERAPY INITIAL EVALUATION ADULT - PLANNED THERAPY INTERVENTIONS, OT EVAL
IADL retraining/balance training/ROM/parent/caregiver training.../strengthening/stretching/transfer training/energy conservation techniques/ADL retraining/bed mobility training

## 2019-11-20 NOTE — OCCUPATIONAL THERAPY INITIAL EVALUATION ADULT - ADDITIONAL COMMENTS
Prior to admission, Pt was functioning in her roles, driving , self sufficient & ambulating independently without any assistive devices. Presently pt needs assistance with lower body self care tasks due to pain, weakness, stiffness and decreased ROM from posterior right THR. . Pt is right hand dominant and wears glasses for reading.

## 2019-11-20 NOTE — PROGRESS NOTE ADULT - SUBJECTIVE AND OBJECTIVE BOX
WHITNEY DICKINSON is a 79y Female s/p RIGHT POSTERIOR TOTAL HIP REPLACEMENT  by Dr. Haynes on 11/19/19. complains of postop pain; patient tolerated surgery well.    ROS; no pulmonary, cardiovascular, gastrointestinal, urological or neurological symptoms.     PHYS: T(C): 36.6 (11-20-19 @ 04:30), Max: 36.8 (11-19-19 @ 20:34)  HR: 53 (11-20-19 @ 04:30) (41 - 76)  BP: 146/67 (11-20-19 @ 04:30) (94/62 - 149/78)  RR: 16 (11-20-19 @ 04:30) (15 - 18)  SpO2: 98% (11-20-19 @ 04:30) (96% - 100%)  vss; lungs, clear; heart, reg rhythm, no murmurs, rubs or gallops;   abd, soft, non tender, normal bowel sounds, no calf tenderness or edema                         11.5   9.92  )-----------( 192      ( 20 Nov 2019 06:34 )             34.9   11-20    140  |  110<H>  |  22  ----------------------------<  132<H>  4.1   |  24  |  0.80    Ca    8.6      20 Nov 2019 06:34      Assessment and Plan: status post right total hip replacement; chronic obstructive pulmonary disease; hypothyroid; random elevated glucose; history of atrial flutter; pain control; deep vein thrombophlebitis prophylaxis; physical therapy; bowel regimen; nutrition support; follow up labs; will follow.

## 2019-11-20 NOTE — DISCHARGE NOTE NURSING/CASE MANAGEMENT/SOCIAL WORK - NSDCFUADDAPPT_GEN_ALL_CORE_FT
Follow up with Dr. Haynes in 2 weeks. Please call [phone] for appointment.      Follow up with your primary care doctor within 1 week to monitor your blood work. Please call to make an appointment.    Please call your surgeon, if you have new onset of fevers, increased drainage, increased pain or increased redness around the incision site. Please return to the Emergency Department if you have chest pain or shortness of breath.

## 2019-11-21 VITALS
OXYGEN SATURATION: 97 % | RESPIRATION RATE: 17 BRPM | HEART RATE: 66 BPM | DIASTOLIC BLOOD PRESSURE: 65 MMHG | TEMPERATURE: 99 F | SYSTOLIC BLOOD PRESSURE: 138 MMHG

## 2019-11-21 LAB
ANION GAP SERPL CALC-SCNC: 7 MMOL/L — SIGNIFICANT CHANGE UP (ref 5–17)
BUN SERPL-MCNC: 13 MG/DL — SIGNIFICANT CHANGE UP (ref 7–23)
CALCIUM SERPL-MCNC: 8.4 MG/DL — LOW (ref 8.5–10.1)
CHLORIDE SERPL-SCNC: 97 MMOL/L — SIGNIFICANT CHANGE UP (ref 96–108)
CO2 SERPL-SCNC: 28 MMOL/L — SIGNIFICANT CHANGE UP (ref 22–31)
CREAT SERPL-MCNC: 0.56 MG/DL — SIGNIFICANT CHANGE UP (ref 0.5–1.3)
GLUCOSE SERPL-MCNC: 111 MG/DL — HIGH (ref 70–99)
HCT VFR BLD CALC: 34.3 % — LOW (ref 34.5–45)
HGB BLD-MCNC: 11.8 G/DL — SIGNIFICANT CHANGE UP (ref 11.5–15.5)
MCHC RBC-ENTMCNC: 34.4 GM/DL — SIGNIFICANT CHANGE UP (ref 32–36)
MCHC RBC-ENTMCNC: 34.5 PG — HIGH (ref 27–34)
MCV RBC AUTO: 100.3 FL — HIGH (ref 80–100)
NRBC # BLD: 0 /100 WBCS — SIGNIFICANT CHANGE UP (ref 0–0)
PLATELET # BLD AUTO: 218 K/UL — SIGNIFICANT CHANGE UP (ref 150–400)
POTASSIUM SERPL-MCNC: 3.8 MMOL/L — SIGNIFICANT CHANGE UP (ref 3.5–5.3)
POTASSIUM SERPL-SCNC: 3.8 MMOL/L — SIGNIFICANT CHANGE UP (ref 3.5–5.3)
RBC # BLD: 3.42 M/UL — LOW (ref 3.8–5.2)
RBC # FLD: 12.9 % — SIGNIFICANT CHANGE UP (ref 10.3–14.5)
SODIUM SERPL-SCNC: 132 MMOL/L — LOW (ref 135–145)
WBC # BLD: 13.75 K/UL — HIGH (ref 3.8–10.5)
WBC # FLD AUTO: 13.75 K/UL — HIGH (ref 3.8–10.5)

## 2019-11-21 PROCEDURE — 74018 RADEX ABDOMEN 1 VIEW: CPT | Mod: 26

## 2019-11-21 RX ORDER — SODIUM CHLORIDE 9 MG/ML
1000 INJECTION, SOLUTION INTRAVENOUS
Refills: 0 | Status: DISCONTINUED | OUTPATIENT
Start: 2019-11-21 | End: 2019-11-21

## 2019-11-21 RX ORDER — MAGNESIUM HYDROXIDE 400 MG/1
30 TABLET, CHEWABLE ORAL
Qty: 0 | Refills: 0 | DISCHARGE
Start: 2019-11-21

## 2019-11-21 RX ORDER — METOCLOPRAMIDE HCL 10 MG
10 TABLET ORAL EVERY 6 HOURS
Refills: 0 | Status: DISCONTINUED | OUTPATIENT
Start: 2019-11-21 | End: 2019-11-21

## 2019-11-21 RX ORDER — LACTULOSE 10 G/15ML
20 SOLUTION ORAL
Refills: 0 | Status: DISCONTINUED | OUTPATIENT
Start: 2019-11-21 | End: 2019-11-21

## 2019-11-21 RX ADMIN — CELECOXIB 200 MILLIGRAM(S): 200 CAPSULE ORAL at 13:04

## 2019-11-21 RX ADMIN — Medication 10 MILLIGRAM(S): at 09:26

## 2019-11-21 RX ADMIN — Medication 975 MILLIGRAM(S): at 06:37

## 2019-11-21 RX ADMIN — TIOTROPIUM BROMIDE 1 CAPSULE(S): 18 CAPSULE ORAL; RESPIRATORY (INHALATION) at 12:06

## 2019-11-21 RX ADMIN — LACTULOSE 20 GRAM(S): 10 SOLUTION ORAL at 13:59

## 2019-11-21 RX ADMIN — Medication 120 MILLIGRAM(S): at 05:42

## 2019-11-21 RX ADMIN — Medication 2 SPRAY(S): at 12:04

## 2019-11-21 RX ADMIN — CELECOXIB 200 MILLIGRAM(S): 200 CAPSULE ORAL at 12:04

## 2019-11-21 RX ADMIN — Medication 325 MILLIGRAM(S): at 05:45

## 2019-11-21 RX ADMIN — SIMETHICONE 80 MILLIGRAM(S): 80 TABLET, CHEWABLE ORAL at 05:43

## 2019-11-21 RX ADMIN — Medication 975 MILLIGRAM(S): at 05:42

## 2019-11-21 RX ADMIN — Medication 975 MILLIGRAM(S): at 13:59

## 2019-11-21 RX ADMIN — Medication 975 MILLIGRAM(S): at 14:59

## 2019-11-21 RX ADMIN — Medication 25 MICROGRAM(S): at 05:42

## 2019-11-21 RX ADMIN — Medication 10 MILLIGRAM(S): at 14:08

## 2019-11-21 RX ADMIN — POLYETHYLENE GLYCOL 3350 17 GRAM(S): 17 POWDER, FOR SOLUTION ORAL at 12:04

## 2019-11-21 RX ADMIN — ONDANSETRON 4 MILLIGRAM(S): 8 TABLET, FILM COATED ORAL at 05:47

## 2019-11-21 RX ADMIN — SODIUM CHLORIDE 75 MILLILITER(S): 9 INJECTION, SOLUTION INTRAVENOUS at 09:25

## 2019-11-21 NOTE — PROGRESS NOTE ADULT - SUBJECTIVE AND OBJECTIVE BOX
Patient is seen and examined at bedside. Denies CP/SOB/Dizziness/N/V/D/HA. Pain is controlled. Ate jello for breakfast but has no appetite. No other PO intake since OR. No flatus.     Vital Signs Last 24 Hrs  T(C): 36.9 (21 Nov 2019 05:45), Max: 37.1 (20 Nov 2019 21:28)  T(F): 98.5 (21 Nov 2019 05:45), Max: 98.8 (20 Nov 2019 21:28)  HR: 69 (21 Nov 2019 10:13) (58 - 89)  BP: 126/62 (21 Nov 2019 10:13) (126/62 - 195/74)  BP(mean): --  RR: 17 (21 Nov 2019 10:13) (16 - 19)  SpO2: 96% (21 Nov 2019 10:13) (96% - 98%)    GEN: NAD  ABD: soft, NT/ND; no rebound or guarding;  Neurologic: AAOx3; CNS grossly intact; no focal deficits  RLE: Prineo Dressing C/D/I. abduction pillow in place  B/L LE's: Motor intact + EHL/FHL/TA/GS in the BL LE. Sensation is grossly intact B/L. Extremities warm B/L. Compartments soft, compressible B/L, no calf tenderness B/L. DP 2+ B/L.    Labs:                          11.8   13.75 )-----------( 218      ( 21 Nov 2019 06:26 )             34.3       11-21    132<L>  |  97  |  13  ----------------------------<  111<H>  3.8   |  28  |  0.56    Ca    8.4<L>      21 Nov 2019 06:26        A/P: Patient is a 79y y/o Female s/p right total hip arthroplasty, POD # 2  -wound care, isometric exercises, GI motility, new medications, hip precautions,  hospital course and discharge planning reviewed with pt  -Pain control/analgesia  -Inc spirometry reviewed and counseled  -Venodynes/foot pumps  -Hyponatremia: Likely from decreased PO intake. Monitor Na- encourage increased PO intake  -PT/OT/WBAT  -Poor PO intake: F/U AXR.  -Will follow.

## 2019-11-21 NOTE — PROGRESS NOTE ADULT - SUBJECTIVE AND OBJECTIVE BOX
WHITNEY DICKINSON is a 79y Female s/p RIGHT POSTERIOR TOTAL HIP REPLACEMENT  by Dr. Haynes on 11/19/19. patient has mild post op pain.    ROS: no pulmonary, cardiovascular, gastrointestinal or urological symptoms     PHYS: T(C): 36.9 (11-21-19 @ 05:45), Max: 37.1 (11-20-19 @ 21:28)  HR: 89 (11-21-19 @ 05:45) (54 - 89)  BP: 165/72 (11-21-19 @ 06:55) (162/68 - 195/74)  RR: 16 (11-21-19 @ 05:45) (15 - 19)  SpO2: 96% (11-21-19 @ 05:45) (96% - 98%)  vss; lungs, clear, heart, reg rhythm, abd, soft non tender, no calf tenderness                         11.8   13.75 )-----------( 218      ( 21 Nov 2019 06:26 )             34.3   11-21    132<L>  |  97  |  13  ----------------------------<  111<H>  3.8   |  28  |  0.56    Ca    8.4<L>      21 Nov 2019 06:26    Assessment and Plan: status post right total hip replacement; chronic obstructive pulmonary disease; hypothyroid; history of atrial flutter; postop leucocytosis postop anemia; random elevated glucose; hyponatremia; pain control; deep vein thrombophlebitis prophylaxis; physical therapy; bowel regimen; nutrition support; follow up labs; will follow.

## 2019-11-22 LAB — SURGICAL PATHOLOGY STUDY: SIGNIFICANT CHANGE UP

## 2019-11-25 DIAGNOSIS — E87.1 HYPO-OSMOLALITY AND HYPONATREMIA: ICD-10-CM

## 2019-11-25 DIAGNOSIS — M16.11 UNILATERAL PRIMARY OSTEOARTHRITIS, RIGHT HIP: ICD-10-CM

## 2019-11-25 DIAGNOSIS — E03.9 HYPOTHYROIDISM, UNSPECIFIED: ICD-10-CM

## 2019-11-25 DIAGNOSIS — J44.9 CHRONIC OBSTRUCTIVE PULMONARY DISEASE, UNSPECIFIED: ICD-10-CM

## 2019-12-13 PROBLEM — J44.9 CHRONIC OBSTRUCTIVE PULMONARY DISEASE, UNSPECIFIED: Chronic | Status: ACTIVE | Noted: 2019-11-05

## 2019-12-13 PROBLEM — E03.9 HYPOTHYROIDISM, UNSPECIFIED: Chronic | Status: ACTIVE | Noted: 2019-11-05

## 2019-12-16 ENCOUNTER — TRANSCRIPTION ENCOUNTER (OUTPATIENT)
Age: 79
End: 2019-12-16

## 2019-12-16 RX ORDER — FOLIC ACID 0.8 MG
1 TABLET ORAL DAILY
Refills: 0 | Status: DISCONTINUED | OUTPATIENT
Start: 2019-12-17 | End: 2019-12-19

## 2019-12-16 RX ORDER — SENNA PLUS 8.6 MG/1
2 TABLET ORAL AT BEDTIME
Refills: 0 | Status: DISCONTINUED | OUTPATIENT
Start: 2019-12-17 | End: 2019-12-19

## 2019-12-16 RX ORDER — PANTOPRAZOLE SODIUM 20 MG/1
40 TABLET, DELAYED RELEASE ORAL
Refills: 0 | Status: DISCONTINUED | OUTPATIENT
Start: 2019-12-17 | End: 2019-12-19

## 2019-12-16 RX ORDER — ACETAMINOPHEN 500 MG
975 TABLET ORAL EVERY 8 HOURS
Refills: 0 | Status: DISCONTINUED | OUTPATIENT
Start: 2019-12-17 | End: 2019-12-19

## 2019-12-16 RX ORDER — ASPIRIN/CALCIUM CARB/MAGNESIUM 324 MG
325 TABLET ORAL
Refills: 0 | Status: DISCONTINUED | OUTPATIENT
Start: 2019-12-18 | End: 2019-12-19

## 2019-12-16 RX ORDER — SODIUM CHLORIDE 9 MG/ML
1000 INJECTION INTRAMUSCULAR; INTRAVENOUS; SUBCUTANEOUS
Refills: 0 | Status: DISCONTINUED | OUTPATIENT
Start: 2019-12-17 | End: 2019-12-19

## 2019-12-16 RX ORDER — MAGNESIUM HYDROXIDE 400 MG/1
30 TABLET, CHEWABLE ORAL DAILY
Refills: 0 | Status: DISCONTINUED | OUTPATIENT
Start: 2019-12-17 | End: 2019-12-19

## 2019-12-16 RX ORDER — CELECOXIB 200 MG/1
200 CAPSULE ORAL DAILY
Refills: 0 | Status: DISCONTINUED | OUTPATIENT
Start: 2019-12-18 | End: 2019-12-19

## 2019-12-16 RX ORDER — POLYETHYLENE GLYCOL 3350 17 G/17G
17 POWDER, FOR SOLUTION ORAL DAILY
Refills: 0 | Status: DISCONTINUED | OUTPATIENT
Start: 2019-12-17 | End: 2019-12-19

## 2019-12-16 RX ORDER — HYDROMORPHONE HYDROCHLORIDE 2 MG/ML
0.5 INJECTION INTRAMUSCULAR; INTRAVENOUS; SUBCUTANEOUS EVERY 4 HOURS
Refills: 0 | Status: DISCONTINUED | OUTPATIENT
Start: 2019-12-17 | End: 2019-12-19

## 2019-12-16 RX ORDER — OXYCODONE HYDROCHLORIDE 5 MG/1
5 TABLET ORAL EVERY 4 HOURS
Refills: 0 | Status: DISCONTINUED | OUTPATIENT
Start: 2019-12-17 | End: 2019-12-19

## 2019-12-16 RX ORDER — FERROUS SULFATE 325(65) MG
325 TABLET ORAL
Refills: 0 | Status: DISCONTINUED | OUTPATIENT
Start: 2019-12-17 | End: 2019-12-19

## 2019-12-16 RX ORDER — ONDANSETRON 8 MG/1
4 TABLET, FILM COATED ORAL EVERY 6 HOURS
Refills: 0 | Status: DISCONTINUED | OUTPATIENT
Start: 2019-12-17 | End: 2019-12-19

## 2019-12-16 RX ORDER — OXYCODONE HYDROCHLORIDE 5 MG/1
10 TABLET ORAL EVERY 4 HOURS
Refills: 0 | Status: DISCONTINUED | OUTPATIENT
Start: 2019-12-17 | End: 2019-12-19

## 2019-12-17 ENCOUNTER — TRANSCRIPTION ENCOUNTER (OUTPATIENT)
Age: 79
End: 2019-12-17

## 2019-12-17 ENCOUNTER — INPATIENT (INPATIENT)
Facility: HOSPITAL | Age: 79
LOS: 1 days | Discharge: NURSING, REHAB OR LONG-TERM CARE | End: 2019-12-19
Attending: ORTHOPAEDIC SURGERY | Admitting: ORTHOPAEDIC SURGERY
Payer: MEDICARE

## 2019-12-17 VITALS
OXYGEN SATURATION: 97 % | RESPIRATION RATE: 18 BRPM | WEIGHT: 164.91 LBS | SYSTOLIC BLOOD PRESSURE: 144 MMHG | TEMPERATURE: 98 F | HEIGHT: 66 IN | DIASTOLIC BLOOD PRESSURE: 57 MMHG

## 2019-12-17 DIAGNOSIS — Z98.49 CATARACT EXTRACTION STATUS, UNSPECIFIED EYE: Chronic | ICD-10-CM

## 2019-12-17 LAB
ANION GAP SERPL CALC-SCNC: 6 MMOL/L — SIGNIFICANT CHANGE UP (ref 5–17)
BUN SERPL-MCNC: 19 MG/DL — SIGNIFICANT CHANGE UP (ref 7–23)
CALCIUM SERPL-MCNC: 9.4 MG/DL — SIGNIFICANT CHANGE UP (ref 8.5–10.1)
CHLORIDE SERPL-SCNC: 110 MMOL/L — HIGH (ref 96–108)
CO2 SERPL-SCNC: 28 MMOL/L — SIGNIFICANT CHANGE UP (ref 22–31)
CREAT SERPL-MCNC: 0.73 MG/DL — SIGNIFICANT CHANGE UP (ref 0.5–1.3)
GLUCOSE SERPL-MCNC: 91 MG/DL — SIGNIFICANT CHANGE UP (ref 70–99)
HCT VFR BLD CALC: 31.6 % — LOW (ref 34.5–45)
HCT VFR BLD CALC: 35.6 % — SIGNIFICANT CHANGE UP (ref 34.5–45)
HGB BLD-MCNC: 10 G/DL — LOW (ref 11.5–15.5)
HGB BLD-MCNC: 11.5 G/DL — SIGNIFICANT CHANGE UP (ref 11.5–15.5)
MCHC RBC-ENTMCNC: 31.6 GM/DL — LOW (ref 32–36)
MCHC RBC-ENTMCNC: 32.3 GM/DL — SIGNIFICANT CHANGE UP (ref 32–36)
MCHC RBC-ENTMCNC: 34.7 PG — HIGH (ref 27–34)
MCHC RBC-ENTMCNC: 35 PG — HIGH (ref 27–34)
MCV RBC AUTO: 108.2 FL — HIGH (ref 80–100)
MCV RBC AUTO: 109.7 FL — HIGH (ref 80–100)
NRBC # BLD: 0 /100 WBCS — SIGNIFICANT CHANGE UP (ref 0–0)
NRBC # BLD: 0 /100 WBCS — SIGNIFICANT CHANGE UP (ref 0–0)
PLATELET # BLD AUTO: 209 K/UL — SIGNIFICANT CHANGE UP (ref 150–400)
PLATELET # BLD AUTO: 236 K/UL — SIGNIFICANT CHANGE UP (ref 150–400)
POTASSIUM SERPL-MCNC: 4.4 MMOL/L — SIGNIFICANT CHANGE UP (ref 3.5–5.3)
POTASSIUM SERPL-SCNC: 4.4 MMOL/L — SIGNIFICANT CHANGE UP (ref 3.5–5.3)
RBC # BLD: 2.88 M/UL — LOW (ref 3.8–5.2)
RBC # BLD: 3.29 M/UL — LOW (ref 3.8–5.2)
RBC # FLD: 14.2 % — SIGNIFICANT CHANGE UP (ref 10.3–14.5)
RBC # FLD: 14.3 % — SIGNIFICANT CHANGE UP (ref 10.3–14.5)
SODIUM SERPL-SCNC: 144 MMOL/L — SIGNIFICANT CHANGE UP (ref 135–145)
WBC # BLD: 5.52 K/UL — SIGNIFICANT CHANGE UP (ref 3.8–10.5)
WBC # BLD: 5.88 K/UL — SIGNIFICANT CHANGE UP (ref 3.8–10.5)
WBC # FLD AUTO: 5.52 K/UL — SIGNIFICANT CHANGE UP (ref 3.8–10.5)
WBC # FLD AUTO: 5.88 K/UL — SIGNIFICANT CHANGE UP (ref 3.8–10.5)

## 2019-12-17 PROCEDURE — 73501 X-RAY EXAM HIP UNI 1 VIEW: CPT | Mod: 26,RT

## 2019-12-17 PROCEDURE — 72170 X-RAY EXAM OF PELVIS: CPT | Mod: 26,59

## 2019-12-17 RX ORDER — OXYCODONE HYDROCHLORIDE 5 MG/1
1 TABLET ORAL
Qty: 42 | Refills: 0
Start: 2019-12-17 | End: 2019-12-23

## 2019-12-17 RX ORDER — FLUTICASONE PROPIONATE 50 MCG
2 SPRAY, SUSPENSION NASAL DAILY
Refills: 0 | Status: DISCONTINUED | OUTPATIENT
Start: 2019-12-17 | End: 2019-12-19

## 2019-12-17 RX ORDER — ACETAMINOPHEN 500 MG
1000 TABLET ORAL ONCE
Refills: 0 | Status: COMPLETED | OUTPATIENT
Start: 2019-12-17 | End: 2019-12-17

## 2019-12-17 RX ORDER — HYDROMORPHONE HYDROCHLORIDE 2 MG/ML
0.5 INJECTION INTRAMUSCULAR; INTRAVENOUS; SUBCUTANEOUS
Refills: 0 | Status: DISCONTINUED | OUTPATIENT
Start: 2019-12-17 | End: 2019-12-17

## 2019-12-17 RX ORDER — ASPIRIN/CALCIUM CARB/MAGNESIUM 324 MG
1 TABLET ORAL
Qty: 0 | Refills: 0 | DISCHARGE
Start: 2019-12-17

## 2019-12-17 RX ORDER — DEXAMETHASONE 0.5 MG/5ML
10 ELIXIR ORAL ONCE
Refills: 0 | Status: COMPLETED | OUTPATIENT
Start: 2019-12-18 | End: 2019-12-18

## 2019-12-17 RX ORDER — SODIUM CHLORIDE 9 MG/ML
1000 INJECTION, SOLUTION INTRAVENOUS
Refills: 0 | Status: DISCONTINUED | OUTPATIENT
Start: 2019-12-17 | End: 2019-12-17

## 2019-12-17 RX ORDER — LEVOTHYROXINE SODIUM 125 MCG
25 TABLET ORAL DAILY
Refills: 0 | Status: DISCONTINUED | OUTPATIENT
Start: 2019-12-17 | End: 2019-12-19

## 2019-12-17 RX ORDER — TIOTROPIUM BROMIDE 18 UG/1
1 CAPSULE ORAL; RESPIRATORY (INHALATION) DAILY
Refills: 0 | Status: DISCONTINUED | OUTPATIENT
Start: 2019-12-17 | End: 2019-12-19

## 2019-12-17 RX ORDER — CEFAZOLIN SODIUM 1 G
2000 VIAL (EA) INJECTION EVERY 8 HOURS
Refills: 0 | Status: COMPLETED | OUTPATIENT
Start: 2019-12-17 | End: 2019-12-18

## 2019-12-17 RX ORDER — ACETAMINOPHEN 500 MG
3 TABLET ORAL
Qty: 0 | Refills: 0 | DISCHARGE
Start: 2019-12-17

## 2019-12-17 RX ORDER — DILTIAZEM HCL 120 MG
120 CAPSULE, EXT RELEASE 24 HR ORAL DAILY
Refills: 0 | Status: DISCONTINUED | OUTPATIENT
Start: 2019-12-17 | End: 2019-12-17

## 2019-12-17 RX ORDER — ATORVASTATIN CALCIUM 80 MG/1
10 TABLET, FILM COATED ORAL AT BEDTIME
Refills: 0 | Status: DISCONTINUED | OUTPATIENT
Start: 2019-12-17 | End: 2019-12-19

## 2019-12-17 RX ORDER — DILTIAZEM HCL 120 MG
120 CAPSULE, EXT RELEASE 24 HR ORAL DAILY
Refills: 0 | Status: DISCONTINUED | OUTPATIENT
Start: 2019-12-17 | End: 2019-12-19

## 2019-12-17 RX ORDER — ACETAMINOPHEN 500 MG
650 TABLET ORAL ONCE
Refills: 0 | Status: COMPLETED | OUTPATIENT
Start: 2019-12-17 | End: 2019-12-17

## 2019-12-17 RX ORDER — CELECOXIB 200 MG/1
200 CAPSULE ORAL ONCE
Refills: 0 | Status: COMPLETED | OUTPATIENT
Start: 2019-12-17 | End: 2019-12-17

## 2019-12-17 RX ADMIN — SODIUM CHLORIDE 100 MILLILITER(S): 9 INJECTION INTRAMUSCULAR; INTRAVENOUS; SUBCUTANEOUS at 20:49

## 2019-12-17 RX ADMIN — SODIUM CHLORIDE 100 MILLILITER(S): 9 INJECTION INTRAMUSCULAR; INTRAVENOUS; SUBCUTANEOUS at 18:41

## 2019-12-17 RX ADMIN — Medication 650 MILLIGRAM(S): at 11:18

## 2019-12-17 RX ADMIN — CELECOXIB 200 MILLIGRAM(S): 200 CAPSULE ORAL at 11:19

## 2019-12-17 RX ADMIN — Medication 1000 MILLIGRAM(S): at 15:25

## 2019-12-17 RX ADMIN — Medication 100 MILLIGRAM(S): at 20:49

## 2019-12-17 RX ADMIN — SODIUM CHLORIDE 75 MILLILITER(S): 9 INJECTION, SOLUTION INTRAVENOUS at 14:39

## 2019-12-17 RX ADMIN — OXYCODONE HYDROCHLORIDE 10 MILLIGRAM(S): 5 TABLET ORAL at 19:23

## 2019-12-17 RX ADMIN — OXYCODONE HYDROCHLORIDE 10 MILLIGRAM(S): 5 TABLET ORAL at 20:09

## 2019-12-17 RX ADMIN — Medication 400 MILLIGRAM(S): at 14:39

## 2019-12-17 NOTE — PHYSICAL THERAPY INITIAL EVALUATION ADULT - TRANSFER TRAINING, PT EVAL
Pt will be able to perform sit to stand, stand pivot transfer using [RW] and maintaining WB precautions  independently in 2 to 3 weeks

## 2019-12-17 NOTE — PROGRESS NOTE ADULT - SUBJECTIVE AND OBJECTIVE BOX
POC  Patient is seen and examined at bedside in NAD.  Denies CP/SOB/Dizziness/N/V/D/HA.  Currently, pain is controlled.  No current complaints.    Vital Signs Last 24 Hrs  T(C): 35.4 (17 Dec 2019 17:48), Max: 37.3 (17 Dec 2019 14:25)  T(F): 95.7 (17 Dec 2019 17:48), Max: 99.2 (17 Dec 2019 14:25)  HR: 74 (17 Dec 2019 17:48) (41 - 74)  BP: 176/81 (17 Dec 2019 17:48) (123/71 - 176/81)  BP(mean): --  RR: 17 (17 Dec 2019 17:48) (14 - 20)  SpO2: 98% (17 Dec 2019 17:48) (97% - 100%)    GEN: NAD  ABD: soft, NT/ND; no rebound or guarding;  Neurologic: AAOx3; CNS grossly intact; no focal deficits  RLE: Prineo Dressing C/D/I.  Abduction pillow in place.  B/L LE's: Motor intact + EHL/FHL/TA/GS in the BL LE.  Sensation is grossly intact B/L.  Extremities warm B/L.  Compartments soft, compressible B/L, no calf tenderness B/L. DP 2+ B/L.    Labs:                          10.0   5.88  )-----------( 209      ( 17 Dec 2019 14:49 )             31.6       12-17    144  |  110<H>  |  19  ----------------------------<  91  4.4   |  28  |  0.73    Ca    9.4      17 Dec 2019 10:37        A/P: Patient is a 79y y/o Female s/p right hip GT ORIF POD #0   -wound care, isometric exercises, GI motility, new medications, hip precautions (posterior and GT),  hospital course and discharge planning reviewed with pt  -Pain control/analgesia  -Inc spirometry reviewed and counseled  -Venodynes/foot pumps  -F/U AM Labs  -PT/OT/WBAT  -Antibiotic - post op  -Anticoagulation - Asa, ambulation

## 2019-12-17 NOTE — DISCHARGE NOTE PROVIDER - NSDCCPTREATMENT_GEN_ALL_CORE_FT
PRINCIPAL PROCEDURE  Procedure: Open reduction and internal fixation of fracture of greater trochanter  Findings and Treatment:

## 2019-12-17 NOTE — PHYSICAL THERAPY INITIAL EVALUATION ADULT - TRANSFER SAFETY CONCERNS NOTED: BED/CHAIR, REHAB EVAL
decreased safety awareness/decreased step length/stepping too close to front of assistive device/decreased sequencing ability/stand pivot/decreased balance during turns

## 2019-12-17 NOTE — DISCHARGE NOTE PROVIDER - CARE PROVIDER_API CALL
Antelmo Haynes)  Orthopaedic Surgery  [address] [place] [address]  Phone: [phone]  Fax: [phone]  Follow Up Time: 2 weeks

## 2019-12-17 NOTE — DISCHARGE NOTE PROVIDER - NSDCMRMEDTOKEN_GEN_ALL_CORE_FT
acetaminophen 325 mg oral tablet: 3 tab(s) orally every 8 hours  aspirin 325 mg oral delayed release tablet: 1 tab(s) orally 2 times a day for DVT prophylaxis  bisacodyl 10 mg rectal suppository: 1 suppository(ies) rectal once a day, As needed, If no bowel movement by POD#2  Breo Ellipta 100 mcg-25 mcg/inh inhalation powder: 1 puff(s) inhaled once a day  celecoxib 200 mg oral capsule: 1 cap(s) orally once a day MDD:1 capsule  DilTIAZem Hydrochloride  mg/24 hours oral capsule, extended release: 1 cap(s) orally once a day  levothyroxine 25 mcg (0.025 mg) oral tablet: 1 tab(s) orally once a day  magnesium hydroxide 8% oral suspension: 30 milliliter(s) orally once a day, As needed, Constipation  Multiple Vitamins oral tablet: 1 tab(s) orally once a day  Nasonex 50 mcg/inh nasal spray: 2 spray(s) nasal once a day  NP Thyroid 30 mg oral tablet: 1 tab(s) orally 2 times a day  pantoprazole 40 mg oral delayed release tablet: 1 tab(s) orally once a day (before a meal) MDD:1 tablet  polyethylene glycol 3350 oral powder for reconstitution: 17 gram(s) orally once a day  pravastatin 10 mg oral tablet: 1 tab(s) orally once a day  Spiriva 18 mcg inhalation capsule: 1 cap(s) inhaled once a day acetaminophen 325 mg oral tablet: 3 tab(s) orally every 8 hours  aspirin 325 mg oral delayed release tablet: 1 tab(s) orally 2 times a day for DVT prophylaxis  bisacodyl 10 mg rectal suppository: 1 suppository(ies) rectal once a day, As needed, If no bowel movement by POD#2  Breo Ellipta 100 mcg-25 mcg/inh inhalation powder: 1 puff(s) inhaled once a day  celecoxib 200 mg oral capsule: 1 cap(s) orally once a day MDD:1 capsule  DilTIAZem Hydrochloride  mg/24 hours oral capsule, extended release: 1 cap(s) orally once a day  levothyroxine 25 mcg (0.025 mg) oral tablet: 1 tab(s) orally once a day  magnesium hydroxide 8% oral suspension: 30 milliliter(s) orally once a day, As needed, Constipation  Multiple Vitamins oral tablet: 1 tab(s) orally once a day  Nasonex 50 mcg/inh nasal spray: 2 spray(s) nasal once a day  NP Thyroid 30 mg oral tablet: 1 tab(s) orally 2 times a day  nystatin 100,000 units/g topical cream: 1 application topically 2 times a day to affected area under the breasts  pantoprazole 40 mg oral delayed release tablet: 1 tab(s) orally once a day (before a meal) MDD:1 tablet  polyethylene glycol 3350 oral powder for reconstitution: 17 gram(s) orally once a day  pravastatin 10 mg oral tablet: 1 tab(s) orally once a day  Spiriva 18 mcg inhalation capsule: 1 cap(s) inhaled once a day acetaminophen 325 mg oral tablet: 3 tab(s) orally every 8 hours  aspirin 325 mg oral delayed release tablet: 1 tab(s) orally 2 times a day for DVT prophylaxis  bisacodyl 10 mg rectal suppository: 1 suppository(ies) rectal once a day, As needed, If no bowel movement by POD#2  Breo Ellipta 100 mcg-25 mcg/inh inhalation powder: 1 puff(s) inhaled once a day  celecoxib 200 mg oral capsule: 1 cap(s) orally once a day MDD:1 capsule  clotrimazole-betamethasone dipropionate 1%-0.05% topical cream: 1 application topically 2 times a day to affected area under the breasts.   DilTIAZem Hydrochloride  mg/24 hours oral capsule, extended release: 1 cap(s) orally once a day  levothyroxine 25 mcg (0.025 mg) oral tablet: 1 tab(s) orally once a day  magnesium hydroxide 8% oral suspension: 30 milliliter(s) orally once a day, As needed, Constipation  Multiple Vitamins oral tablet: 1 tab(s) orally once a day  Nasonex 50 mcg/inh nasal spray: 2 spray(s) nasal once a day  NP Thyroid 30 mg oral tablet: 1 tab(s) orally 2 times a day  pantoprazole 40 mg oral delayed release tablet: 1 tab(s) orally once a day (before a meal) MDD:1 tablet  polyethylene glycol 3350 oral powder for reconstitution: 17 gram(s) orally once a day  pravastatin 10 mg oral tablet: 1 tab(s) orally once a day  Spiriva 18 mcg inhalation capsule: 1 cap(s) inhaled once a day

## 2019-12-17 NOTE — PHYSICAL THERAPY INITIAL EVALUATION ADULT - CRITERIA FOR SKILLED THERAPEUTIC INTERVENTIONS
therapy frequency/anticipated equipment needs at discharge/anticipated discharge recommendation/Subacute rehab/impairments found/functional limitations in following categories/predicted duration of therapy intervention/risk reduction/prevention/rehab potential

## 2019-12-17 NOTE — DISCHARGE NOTE PROVIDER - NSDCFUADDAPPT_GEN_ALL_CORE_FT
Follow up with your surgeon in two weeks. Call for appointment.  If you need more pain medication, call your surgeon's office.  We recommend that you call and schedule a follow up appointment with your primary care physician for repeat blood work (CBC and BMP) for post hospital discharge follow-up care.  Call your surgeon if you have increased redness/pain/drainage or fever. Return to ER for shortness of breath/calf tenderness.

## 2019-12-17 NOTE — PHYSICAL THERAPY INITIAL EVALUATION ADULT - ADDITIONAL COMMENTS
confirmed and verified post op, : Pt lives with her spouse (whom works but can assist after work) in a private home, 3 entry steps (+ R rail to ascend), 7 steps (R rail), landing, then additional 7 steps (R rail) to 2nd level. Pt has a tub/shower combo, no grab bars, fixed shower head, & standard toilet seat height. pt was independent during functional with no AD, drives.

## 2019-12-17 NOTE — DISCHARGE NOTE PROVIDER - NSDCACTIVITY_GEN_ALL_CORE
Walking - Outdoors allowed/Do not make important decisions/Do not drive or operate machinery/Stairs allowed/Showering allowed/Walking - Indoors allowed/No heavy lifting/straining

## 2019-12-17 NOTE — DISCHARGE NOTE PROVIDER - NSDCCPCAREPLAN_GEN_ALL_CORE_FT
PRINCIPAL DISCHARGE DIAGNOSIS  Diagnosis: Greater trochanter fracture  Assessment and Plan of Treatment: PRINCIPAL DISCHARGE DIAGNOSIS  Diagnosis: Greater trochanter fracture  Assessment and Plan of Treatment:       SECONDARY DISCHARGE DIAGNOSES  Diagnosis: Candidiasis of breast  Assessment and Plan of Treatment:

## 2019-12-17 NOTE — H&P ADULT - ASSESSMENT
S/p R TERRI 4 weeks ago, with displaced greater trochanter fracture, here for ORIF of the R greater trochanter.

## 2019-12-17 NOTE — H&P ADULT - HISTORY OF PRESENT ILLNESS
Above Above  80 yo female states that she had a right total hip arthroplasty 4 weeks prior.  About 2 weeks ago, pt was walking when she heard a noise in the right hip and had some pain in the right hip with ambulating.  Pt followed up with Dr. Haynes in the office and started 10% weight bearing to the right lower extremity.  Pt has been taking aspirin 325 twice a day for DVT prophylaxis.  No other complaints at this time.

## 2019-12-17 NOTE — DISCHARGE NOTE PROVIDER - HOSPITAL COURSE
79yFemale with history of OA and GT fracture presenting for R hip GT ORIF by Dr. Haynes on 12/17/19. Risk and benefits of surgery were explained to the patient.  The patient understood and agreed to proceed with surgery.  Patient underwent the procedure with no intraoperative complications.  Pt was brought in stable condition to the PACU.  Once stable in PACU, pt was brought to the floor.  During hospital stay pt was followed by Medicine, [social work or home care] during this admission.  Pt had an uneventful hospital course. Pt is stable for discharge to home on POD#1. 79yFemale with history of OA and GT fracture presenting for R hip GT ORIF by Dr. Haynes on 12/17/19. Risk and benefits of surgery were explained to the patient.  The patient understood and agreed to proceed with surgery.  Patient underwent the procedure with no intraoperative complications.  Pt was brought in stable condition to the PACU.  Once stable in PACU, pt was brought to the floor.  During hospital stay pt was followed by Medicine, PT/OT and social work during this admission.  Pt had an uneventful hospital course. Pt is stable for discharge to home on POD#1. 79yFemale with history of OA and GT fracture presenting for R hip GT ORIF by Dr. Haynes on 12/17/19. Risk and benefits of surgery were explained to the patient.  The patient understood and agreed to proceed with surgery.  Patient underwent the procedure with no intraoperative complications.  Pt was brought in stable condition to the PACU.  Once stable in PACU, pt was brought to the floor.  During hospital stay pt was followed by Medicine, PT/OT and social work during this admission.  Pt had an uneventful hospital course. Pt is stable for discharge to rehab on POD#2.

## 2019-12-17 NOTE — H&P ADULT - NSHPPHYSICALEXAM_GEN_ALL_CORE
Pain with hip ROM and over the GT, intact distally Right hip: Skin intact.  Well healed incision.  Pain with hip ROM and over the GT, intact distally. + ROM knee, ankle, toes.  Sensation grossly intact.  DP2+.

## 2019-12-17 NOTE — DISCHARGE NOTE PROVIDER - NSDCFUADDINST_GEN_ALL_CORE_FT
Keep Prineo Dressing Clean, Dry and Intact. May shower with Prineo Dressing. Please do not scrub, soak, peel or pick at the prineo dressing. No creams, lotions, or oils over dressing. May shower and let water run over incision, no baths. Pat dry once out of shower. Dressing to be removed in office at follow up visit in 2 weeks. Keep Prineo Dressing Clean, Dry and Intact. May shower with Prineo Dressing. Please do not scrub, soak, peel or pick at the prineo dressing. No creams, lotions, or oils over dressing. May shower and let water run over incision, no baths. Pat dry once out of shower. Dressing to be removed in office at follow up visit in 2 weeks.    Hip replacement precautions: Abduction pillow. Elevate the leg (while keeping hip precautions) as often as possible to help control swelling.

## 2019-12-17 NOTE — PHYSICAL THERAPY INITIAL EVALUATION ADULT - TRANSFER SAFETY CONCERNS NOTED: SIT/STAND, REHAB EVAL
decreased step length/decreased safety awareness/decreased sequencing ability/stepping too close to front of assistive device/decreased balance during turns

## 2019-12-17 NOTE — PHYSICAL THERAPY INITIAL EVALUATION ADULT - GAIT TRAINING, PT EVAL
Pt will be able to ambulate 100 feet using [RW] and maintaining WB precautions  independently in 2 weeks

## 2019-12-18 LAB
ANION GAP SERPL CALC-SCNC: 5 MMOL/L — SIGNIFICANT CHANGE UP (ref 5–17)
BUN SERPL-MCNC: 21 MG/DL — SIGNIFICANT CHANGE UP (ref 7–23)
CALCIUM SERPL-MCNC: 9.2 MG/DL — SIGNIFICANT CHANGE UP (ref 8.5–10.1)
CHLORIDE SERPL-SCNC: 106 MMOL/L — SIGNIFICANT CHANGE UP (ref 96–108)
CO2 SERPL-SCNC: 28 MMOL/L — SIGNIFICANT CHANGE UP (ref 22–31)
CREAT SERPL-MCNC: 0.69 MG/DL — SIGNIFICANT CHANGE UP (ref 0.5–1.3)
GLUCOSE SERPL-MCNC: 132 MG/DL — HIGH (ref 70–99)
HCT VFR BLD CALC: 30.6 % — LOW (ref 34.5–45)
HGB BLD-MCNC: 10.1 G/DL — LOW (ref 11.5–15.5)
MCHC RBC-ENTMCNC: 33 GM/DL — SIGNIFICANT CHANGE UP (ref 32–36)
MCHC RBC-ENTMCNC: 35.3 PG — HIGH (ref 27–34)
MCV RBC AUTO: 107 FL — HIGH (ref 80–100)
NRBC # BLD: 0 /100 WBCS — SIGNIFICANT CHANGE UP (ref 0–0)
PLATELET # BLD AUTO: 219 K/UL — SIGNIFICANT CHANGE UP (ref 150–400)
POTASSIUM SERPL-MCNC: 4.1 MMOL/L — SIGNIFICANT CHANGE UP (ref 3.5–5.3)
POTASSIUM SERPL-SCNC: 4.1 MMOL/L — SIGNIFICANT CHANGE UP (ref 3.5–5.3)
RBC # BLD: 2.86 M/UL — LOW (ref 3.8–5.2)
RBC # FLD: 13.7 % — SIGNIFICANT CHANGE UP (ref 10.3–14.5)
SODIUM SERPL-SCNC: 139 MMOL/L — SIGNIFICANT CHANGE UP (ref 135–145)
WBC # BLD: 7.56 K/UL — SIGNIFICANT CHANGE UP (ref 3.8–10.5)
WBC # FLD AUTO: 7.56 K/UL — SIGNIFICANT CHANGE UP (ref 3.8–10.5)

## 2019-12-18 RX ORDER — NYSTATIN CREAM 100000 [USP'U]/G
1 CREAM TOPICAL
Qty: 0 | Refills: 0 | DISCHARGE
Start: 2019-12-18

## 2019-12-18 RX ORDER — KETOROLAC TROMETHAMINE 30 MG/ML
30 SYRINGE (ML) INJECTION ONCE
Refills: 0 | Status: DISCONTINUED | OUTPATIENT
Start: 2019-12-18 | End: 2019-12-18

## 2019-12-18 RX ORDER — NYSTATIN CREAM 100000 [USP'U]/G
1 CREAM TOPICAL
Refills: 0 | Status: DISCONTINUED | OUTPATIENT
Start: 2019-12-18 | End: 2019-12-19

## 2019-12-18 RX ADMIN — ATORVASTATIN CALCIUM 10 MILLIGRAM(S): 80 TABLET, FILM COATED ORAL at 21:27

## 2019-12-18 RX ADMIN — Medication 30 MILLIGRAM(S): at 10:30

## 2019-12-18 RX ADMIN — Medication 2 SPRAY(S): at 11:39

## 2019-12-18 RX ADMIN — Medication 325 MILLIGRAM(S): at 17:34

## 2019-12-18 RX ADMIN — Medication 100 MILLIGRAM(S): at 04:29

## 2019-12-18 RX ADMIN — Medication 1 TABLET(S): at 11:39

## 2019-12-18 RX ADMIN — OXYCODONE HYDROCHLORIDE 10 MILLIGRAM(S): 5 TABLET ORAL at 05:00

## 2019-12-18 RX ADMIN — Medication 325 MILLIGRAM(S): at 07:19

## 2019-12-18 RX ADMIN — Medication 30 MILLIGRAM(S): at 10:04

## 2019-12-18 RX ADMIN — Medication 975 MILLIGRAM(S): at 06:40

## 2019-12-18 RX ADMIN — CELECOXIB 200 MILLIGRAM(S): 200 CAPSULE ORAL at 11:39

## 2019-12-18 RX ADMIN — Medication 975 MILLIGRAM(S): at 05:48

## 2019-12-18 RX ADMIN — PANTOPRAZOLE SODIUM 40 MILLIGRAM(S): 20 TABLET, DELAYED RELEASE ORAL at 05:48

## 2019-12-18 RX ADMIN — OXYCODONE HYDROCHLORIDE 10 MILLIGRAM(S): 5 TABLET ORAL at 04:02

## 2019-12-18 RX ADMIN — NYSTATIN CREAM 1 APPLICATION(S): 100000 CREAM TOPICAL at 11:39

## 2019-12-18 RX ADMIN — Medication 1 MILLIGRAM(S): at 11:39

## 2019-12-18 RX ADMIN — TIOTROPIUM BROMIDE 1 CAPSULE(S): 18 CAPSULE ORAL; RESPIRATORY (INHALATION) at 11:40

## 2019-12-18 RX ADMIN — OXYCODONE HYDROCHLORIDE 10 MILLIGRAM(S): 5 TABLET ORAL at 21:26

## 2019-12-18 RX ADMIN — Medication 102 MILLIGRAM(S): at 05:49

## 2019-12-18 RX ADMIN — CELECOXIB 200 MILLIGRAM(S): 200 CAPSULE ORAL at 12:40

## 2019-12-18 RX ADMIN — Medication 975 MILLIGRAM(S): at 21:26

## 2019-12-18 RX ADMIN — Medication 975 MILLIGRAM(S): at 15:46

## 2019-12-18 RX ADMIN — POLYETHYLENE GLYCOL 3350 17 GRAM(S): 17 POWDER, FOR SOLUTION ORAL at 11:39

## 2019-12-18 RX ADMIN — OXYCODONE HYDROCHLORIDE 10 MILLIGRAM(S): 5 TABLET ORAL at 22:26

## 2019-12-18 RX ADMIN — Medication 325 MILLIGRAM(S): at 07:53

## 2019-12-18 RX ADMIN — SODIUM CHLORIDE 100 MILLILITER(S): 9 INJECTION INTRAMUSCULAR; INTRAVENOUS; SUBCUTANEOUS at 04:01

## 2019-12-18 RX ADMIN — Medication 975 MILLIGRAM(S): at 22:26

## 2019-12-18 RX ADMIN — Medication 120 MILLIGRAM(S): at 05:49

## 2019-12-18 RX ADMIN — Medication 975 MILLIGRAM(S): at 13:59

## 2019-12-18 RX ADMIN — Medication 25 MICROGRAM(S): at 05:48

## 2019-12-18 NOTE — OCCUPATIONAL THERAPY INITIAL EVALUATION ADULT - BALANCE TRAINING, PT EVAL
Pt with increased standing balance from fair  to good - in 3o days to prevent falls, optimize pt's ability for ADL management & safely navigate in all terrains

## 2019-12-18 NOTE — OCCUPATIONAL THERAPY INITIAL EVALUATION ADULT - LIVES WITH, PROFILE
in a private house hose with 3 entry steps equipped with right hand rail.  Once inside, pt has to negotiate 2 sets of 7 steps each with right hand to access the bedroom and bathroom.  The bathroom has a tub/shower combination, standard toilet and no grab bars. ./spouse

## 2019-12-18 NOTE — OCCUPATIONAL THERAPY INITIAL EVALUATION ADULT - PERSONAL SAFETY AND JUDGMENT, REHAB EVAL
Pt needs verbal cues for proper hand placement and to safely maneuver rolling walker.  Pt also needed  repeated reminders to safely incorporate THP with ADL., functional mobility and to avoid internal rotation of right hip during turns./at risk behaviors demonstrated at risk behaviors demonstrated/Pt needs verbal cues for proper hand placement and to safely maneuver rolling walker. Pt also needs repeated reminders to safely incorporate THP with ADL., functional mobility and to avoid internal rotation of right hip during turns.

## 2019-12-18 NOTE — OCCUPATIONAL THERAPY INITIAL EVALUATION ADULT - TRANSFER SAFETY CONCERNS NOTED: BED/CHAIR, REHAB EVAL
squat pivot/decreased proprioception/decreased safety awareness/decreased sequencing ability/stepping too close to front of assistive device/decreased weight-shifting ability

## 2019-12-18 NOTE — OCCUPATIONAL THERAPY INITIAL EVALUATION ADULT - ADL RETRAINING, OT EVAL
Pt will perform all aspects of lower body tasks independently with use of adaptive device /set up within 4 week while adhering to all posterior hip precautions .

## 2019-12-18 NOTE — OCCUPATIONAL THERAPY INITIAL EVALUATION ADULT - RANGE OF MOTION EXAMINATION, LOWER EXTREMITY
Left LE Active ROM was WFL (within functional limits)/AAROM in right hip is 45 degrees with pain/Left LE Passive ROM was WFL (w/i functional limits)

## 2019-12-18 NOTE — OCCUPATIONAL THERAPY INITIAL EVALUATION ADULT - GENERAL OBSERVATIONS, REHAB EVAL
Pt was seen for initial OT consult, encountered OOB to chair on cardiac monitoring; pt was AA&Ox4, cooperative & followed commands. Posterior THP were reviewed & maintained. Pt c/o right pain due to s/p ORIF ; this limits pt's activity tolerance ,balance, ROM ADL management and functional mobility.

## 2019-12-18 NOTE — OCCUPATIONAL THERAPY INITIAL EVALUATION ADULT - ADDITIONAL COMMENTS
Prior to admission, pt was functioning in her roles, self sufficient & ambulating independently with rolling walker ; pt's   assisted her with ADL PRN. Presently pt needs more assistance with lower body self care tasks and functional mobility  due to pain, weakness, stiffness and decreased ROM from right  Hip ORIF , Pt is right hand dominant and wears glasses for reading. . Prior to admission, pt was functioning in her roles, self sufficient & ambulating independently with rolling walker ; pt's   assisted her with ADL PRN. Presently, pt needs more assistance with lower body self care tasks and functional mobility  due to pain, weakness, stiffness and decreased ROM from right  Hip ORIF , Pt is right hand dominant and wears glasses for reading. .

## 2019-12-18 NOTE — OCCUPATIONAL THERAPY INITIAL EVALUATION ADULT - PARENT CAREGIVER TRAINING, OT EVAL
Pt will state all posterior hip precautions and safely incorporate with ADL and functional mobility within 4 weeks independently  .

## 2019-12-18 NOTE — OCCUPATIONAL THERAPY INITIAL EVALUATION ADULT - PLANNED THERAPY INTERVENTIONS, OT EVAL
ADL retraining/bed mobility training/motor coordination training/ROM/strengthening/parent/caregiver training.../stretching/transfer training/IADL retraining/balance training/energy conservation techniques

## 2019-12-18 NOTE — OCCUPATIONAL THERAPY INITIAL EVALUATION ADULT - SOCIAL CONCERNS
Pt voiced concerns about her recovery at home due to lack of support./Complex psychosocial needs/coping issues

## 2019-12-18 NOTE — CONSULT NOTE ADULT - SUBJECTIVE AND OBJECTIVE BOX
WHITNEY DICKINSON is a 79y Female s/p RIGHT HIP OPEN REDUCTION INTERNAL FIXATION OF THE GREATER TR  by Dr. Haynes on 12/17/10. complains of postop pain; patient tolerated surgery well.      PMH:  w/ h/o Hypothyroidism  History of atrial flutter  Chronic obstructive pulmonary disease, unspecified COPD type    ROS: no fevers, chills, headache, dizziness, lightheadedness, chest pain, palpitations, shortness of breath, cough, phlegm, wheezing, abdominal pain, nausea, vomiting, diarrhea, constipation or urinary symptoms     PSH: H/O cataract extraction    No pertinent family history in first degree relatives    SH: does not smoke or drink at this time    No Known Allergies    MEDS:  acetaminophen   Tablet .. 975 milliGRAM(s) Oral every 8 hours  aluminum hydroxide/magnesium hydroxide/simethicone Suspension 30 milliLiter(s) Oral four times a day PRN  aspirin enteric coated 325 milliGRAM(s) Oral two times a day  atorvastatin 10 milliGRAM(s) Oral at bedtime  celecoxib 200 milliGRAM(s) Oral daily  diltiazem    milliGRAM(s) Oral daily  ferrous    sulfate 325 milliGRAM(s) Oral three times a day with meals  fluticasone propionate 50 MICROgram(s)/spray Nasal Spray 2 Spray(s) Both Nostrils daily  folic acid 1 milliGRAM(s) Oral daily  HYDROmorphone  Injectable 0.5 milliGRAM(s) IV Push every 4 hours PRN  levothyroxine 25 MICROGram(s) Oral daily  magnesium hydroxide Suspension 30 milliLiter(s) Oral daily PRN  multivitamin 1 Tablet(s) Oral daily  nystatin Cream 1 Application(s) Topical two times a day  ondansetron Injectable 4 milliGRAM(s) IV Push every 6 hours PRN  oxyCODONE    IR 5 milliGRAM(s) Oral every 4 hours PRN  oxyCODONE    IR 10 milliGRAM(s) Oral every 4 hours PRN  pantoprazole    Tablet 40 milliGRAM(s) Oral before breakfast  polyethylene glycol 3350 17 Gram(s) Oral daily  senna 2 Tablet(s) Oral at bedtime PRN  sodium chloride 0.9%. 1000 milliLiter(s) IV Continuous <Continuous>  tiotropium 18 MICROgram(s) Capsule 1 Capsule(s) Inhalation daily    PHYS: T(C): 36.1 (12-18-19 @ 19:21), Max: 36.9 (12-18-19 @ 03:45)  HR: 98 (12-18-19 @ 21:05) (60 - 98)  BP: 122/58 (12-18-19 @ 21:05) (122/58 - 156/76)  RR: 14 (12-18-19 @ 21:05) (14 - 16)  SpO2: 96% (12-18-19 @ 21:05) (95% - 99%)  HEENT unremarkable  neck no JVD or bruit  lungs, clear bilaterally  heart, regular rhythm, normal S1, S2, no murmurs, rubs or gallops  abdomen, soft, non tender, no organomegaly, normal bowel sounds   no cyanosis, clubbing, edema or calf tenderness   neuro, grossly normal                         10.1   7.56  )-----------( 219      ( 18 Dec 2019 06:26 )             30.6   12-18    139  |  106  |  21  ----------------------------<  132<H>  4.1   |  28  |  0.69    Ca    9.2      18 Dec 2019 06:26      Assessment and Plan: right hip greater trochanter fracture, status post ORIF; chronic obstructive pulmonary disease; hypothyroid; Hypertension; hyperlipidemia; Gastroesophageal reflux disease; random elevated glucose; postop anemia; postop leucocytosis; history of atrial flutter; pain control; deep vein thrombophlebitis prophylaxis; physical therapy; bowel regimen; nutrition support; follow up labs; will follow.

## 2019-12-18 NOTE — PROGRESS NOTE ADULT - SUBJECTIVE AND OBJECTIVE BOX
Patient is seen and examined at bedside. Denies CP/SOB/Dizziness/N/V/D/HA. Pain is controlled.     Vital Signs Last 24 Hrs  T(C): 36.6 (18 Dec 2019 05:25), Max: 37.3 (17 Dec 2019 14:25)  T(F): 97.8 (18 Dec 2019 05:25), Max: 99.2 (17 Dec 2019 14:25)  HR: 72 (18 Dec 2019 05:25) (41 - 74)  BP: 134/99 (18 Dec 2019 05:25) (123/71 - 176/81)  BP(mean): --  RR: 16 (18 Dec 2019 05:25) (14 - 20)  SpO2: 98% (18 Dec 2019 05:25) (95% - 100%)    GEN: NAD  ABD: soft, NT/ND; no rebound or guarding;  Neurologic: AAOx3; CNS grossly intact; no focal deficits  B/L Inframammary: Linear erythematous plaque with fissuring at breast fold  Right hip: Prineo Dressing C/D/I.    B/L LE's: Motor intact + EHL/FHL/TA/GS in the BL LE. Sensation is grossly intact B/L. Extremities warm B/L. Compartments soft, compressible B/L, no calf tenderness B/L. DP 2+ B/L.    Labs:                          10.1   7.56  )-----------( 219      ( 18 Dec 2019 06:26 )             30.6       12-18    139  |  106  |  21  ----------------------------<  132<H>  4.1   |  28  |  0.69    Ca    9.2      18 Dec 2019 06:26        A/P: Patient is a 79y y/o Female s/p right hip ORIF/GT fx after TERRI, POD # 1  -wound care, isometric exercises, GI motility, new medications, hip precautions,  hospital course and discharge planning reviewed with pt  -Pain control/analgesia - toradol 30mg X 1 dose  -Inc spirometry reviewed and counseled  -Venodynes/foot pumps  -PT/OT/WBAT  -Anticoagulation: ASA 325mg BID  -Medical consult appreciated - Dr. Marie  -Candidiasis of breast :Nystatin cream BID  -Pt seen in am with Dr. Haynes  -DC plan: rehab today

## 2019-12-19 ENCOUNTER — TRANSCRIPTION ENCOUNTER (OUTPATIENT)
Age: 79
End: 2019-12-19

## 2019-12-19 VITALS
DIASTOLIC BLOOD PRESSURE: 52 MMHG | OXYGEN SATURATION: 96 % | SYSTOLIC BLOOD PRESSURE: 138 MMHG | TEMPERATURE: 97 F | HEART RATE: 58 BPM | RESPIRATION RATE: 17 BRPM

## 2019-12-19 LAB
ANION GAP SERPL CALC-SCNC: 6 MMOL/L — SIGNIFICANT CHANGE UP (ref 5–17)
BUN SERPL-MCNC: 33 MG/DL — HIGH (ref 7–23)
CALCIUM SERPL-MCNC: 9.3 MG/DL — SIGNIFICANT CHANGE UP (ref 8.5–10.1)
CHLORIDE SERPL-SCNC: 110 MMOL/L — HIGH (ref 96–108)
CO2 SERPL-SCNC: 27 MMOL/L — SIGNIFICANT CHANGE UP (ref 22–31)
CREAT SERPL-MCNC: 0.75 MG/DL — SIGNIFICANT CHANGE UP (ref 0.5–1.3)
GLUCOSE SERPL-MCNC: 100 MG/DL — HIGH (ref 70–99)
INR BLD: 0.99 RATIO — SIGNIFICANT CHANGE UP (ref 0.88–1.16)
POTASSIUM SERPL-MCNC: 3.8 MMOL/L — SIGNIFICANT CHANGE UP (ref 3.5–5.3)
POTASSIUM SERPL-SCNC: 3.8 MMOL/L — SIGNIFICANT CHANGE UP (ref 3.5–5.3)
PROTHROM AB SERPL-ACNC: 11.1 SEC — SIGNIFICANT CHANGE UP (ref 10–12.9)
SODIUM SERPL-SCNC: 143 MMOL/L — SIGNIFICANT CHANGE UP (ref 135–145)

## 2019-12-19 RX ORDER — CLOTRIMAZOLE AND BETAMETHASONE DIPROPIONATE 10; .5 MG/G; MG/G
1 CREAM TOPICAL
Refills: 0 | Status: DISCONTINUED | OUTPATIENT
Start: 2019-12-19 | End: 2019-12-19

## 2019-12-19 RX ORDER — CLOTRIMAZOLE AND BETAMETHASONE DIPROPIONATE 10; .5 MG/G; MG/G
1 CREAM TOPICAL
Qty: 0 | Refills: 0 | DISCHARGE
Start: 2019-12-19

## 2019-12-19 RX ADMIN — Medication 2 SPRAY(S): at 11:29

## 2019-12-19 RX ADMIN — CELECOXIB 200 MILLIGRAM(S): 200 CAPSULE ORAL at 12:30

## 2019-12-19 RX ADMIN — Medication 325 MILLIGRAM(S): at 05:06

## 2019-12-19 RX ADMIN — CELECOXIB 200 MILLIGRAM(S): 200 CAPSULE ORAL at 11:29

## 2019-12-19 RX ADMIN — Medication 25 MICROGRAM(S): at 05:07

## 2019-12-19 RX ADMIN — Medication 120 MILLIGRAM(S): at 05:07

## 2019-12-19 RX ADMIN — Medication 1 MILLIGRAM(S): at 11:30

## 2019-12-19 RX ADMIN — PANTOPRAZOLE SODIUM 40 MILLIGRAM(S): 20 TABLET, DELAYED RELEASE ORAL at 07:41

## 2019-12-19 RX ADMIN — NYSTATIN CREAM 1 APPLICATION(S): 100000 CREAM TOPICAL at 05:07

## 2019-12-19 RX ADMIN — Medication 325 MILLIGRAM(S): at 11:30

## 2019-12-19 RX ADMIN — Medication 325 MILLIGRAM(S): at 07:41

## 2019-12-19 RX ADMIN — Medication 975 MILLIGRAM(S): at 05:06

## 2019-12-19 RX ADMIN — Medication 1 TABLET(S): at 11:29

## 2019-12-19 RX ADMIN — Medication 975 MILLIGRAM(S): at 06:06

## 2019-12-19 RX ADMIN — TIOTROPIUM BROMIDE 1 CAPSULE(S): 18 CAPSULE ORAL; RESPIRATORY (INHALATION) at 11:30

## 2019-12-19 RX ADMIN — Medication 975 MILLIGRAM(S): at 14:41

## 2019-12-19 RX ADMIN — Medication 975 MILLIGRAM(S): at 15:00

## 2019-12-19 RX ADMIN — POLYETHYLENE GLYCOL 3350 17 GRAM(S): 17 POWDER, FOR SOLUTION ORAL at 11:29

## 2019-12-19 NOTE — PROGRESS NOTE ADULT - SUBJECTIVE AND OBJECTIVE BOX
WHITNEY DICKINSON is a 79y Female s/p RIGHT HIP OPEN REDUCTION INTERNAL FIXATION OF THE GREATER TR  by Dr. Haynes on 12/17/19. complains of postop pain; patient tolerated surgery well.     ROS: no pulmonary, cardiovascular, gastrointestinal, urological or neurological symptoms.     PHYS:  T(C): 36.7 (12-19-19 @ 05:08), Max: 36.7 (12-18-19 @ 11:45)  HR: 70 (12-19-19 @ 05:08) (60 - 98)  BP: 140/77 (12-19-19 @ 05:08) (122/58 - 156/76)  RR: 17 (12-19-19 @ 05:08) (14 - 17)  SpO2: 97% (12-19-19 @ 05:08) (96% - 99%)  vss; lungs, clear; heart, reg rhythm, no murmurs, rubs or gallops;   abd, soft, non tender, normal bowel sounds, no calf tenderness or edema                         10.1   7.56  )-----------( 219      ( 18 Dec 2019 06:26 )             30.6   12-18    139  |  106  |  21  ----------------------------<  132<H>  4.1   |  28  |  0.69    Ca    9.2      18 Dec 2019 06:26      Assessment and Plan: status post ORIF right hip greater trochanter fracture; chronic obstructive pulmonary disease; hypothyroid; random elevated glucose; postop anemia; postop leucocytosis; Hypertension; hyperlipidemia; Gastroesophageal reflux disease; history of atrial flutter; pain control; deep vein thrombophlebitis prophylaxis; physical therapy; bowel regimen; nutrition support; follow up labs; will follow.

## 2019-12-19 NOTE — PROGRESS NOTE ADULT - SUBJECTIVE AND OBJECTIVE BOX
Patient is seen and examined at bedside. Denies CP/SOB/Dizziness/N/V/D/HA. Pain is controlled.     Vital Signs Last 24 Hrs  T(C): 36.7 (19 Dec 2019 05:08), Max: 36.7 (18 Dec 2019 11:45)  T(F): 98 (19 Dec 2019 05:08), Max: 98.1 (18 Dec 2019 11:45)  HR: 70 (19 Dec 2019 05:08) (60 - 98)  BP: 140/77 (19 Dec 2019 05:08) (122/58 - 156/76)  BP(mean): --  RR: 17 (19 Dec 2019 05:08) (14 - 17)  SpO2: 97% (19 Dec 2019 05:08) (96% - 99%)    GEN: NAD  ABD: soft, NT/ND; no rebound or guarding;  Neurologic: AAOx3; CNS grossly intact; no focal deficits  Breasts: Rash improving.   Right hip: Prineo Dressing C/D/I. abduction pillow in place  B/L LE's: Motor intact + EHL/FHL/TA/GS in the BL LE. Sensation is grossly intact B/L. Extremities warm B/L. Compartments soft, compressible B/L, no calf tenderness B/L. DP 2+ B/L.    Labs:                          10.1   7.56  )-----------( 219      ( 18 Dec 2019 06:26 )             30.6       12-18    139  |  106  |  21  ----------------------------<  132<H>  4.1   |  28  |  0.69    Ca    9.2      18 Dec 2019 06:26        A/P: Patient is a 79y y/o Female s/p ORIF GT periposthetic TERRI fx, POD # 2   -wound care, isometric exercises, GI motility, new medications, hip precautions,  hospital course and discharge planning reviewed with pt  -Pain control/analgesia -controlled   -Inc spirometry reviewed and counseled  -Venodynes/foot pumps  -F/U AM Labs  -PT/OT/WBAT  -Antifungal changed to clotrimazole for breast candidiasis  -Anticoagulation: ASA 325mg BID  -DC to rehab today

## 2019-12-19 NOTE — DISCHARGE NOTE NURSING/CASE MANAGEMENT/SOCIAL WORK - PATIENT PORTAL LINK FT
You can access the FollowMyHealth Patient Portal offered by [facility] by registering at the following website: [email or web address]. By joining [facility] FollowMyHealth portal, you will also be able to view your health information using other applications (apps) compatible with our system.

## 2019-12-19 NOTE — DISCHARGE NOTE NURSING/CASE MANAGEMENT/SOCIAL WORK - NSDCPNINST_GEN_ALL_CORE
Take your medications exactly as prescribed. Having your pain under control will help increase activity, improve deep breathing and coughing and prevent complications like pneumonia and blood clots in your legs. Some of the common side effects of pain medications are nausea, vomiting, itching, rash and upset stomach. Contact your doctor if you develop these or any other unusual systoms. Eat a diet rich in fiber and drink plenty of oral fluids. Use other pain management methods like, cold and warm applications, elevation of affected body part, listening to music, watching TV, yoga, etc. Take your medications exactly as prescribed. Having your pain under control will help increase activity, improve deep breathing and coughing and prevent complications like pneumonia and blood clots in your legs. Some of the common side effects of pain medications are nausea, vomiting, itching, rash and upset stomach. Contact your doctor if you develop these or any other unusual systoms. Eat a diet rich in fiber and drink plenty of oral fluids. Use other pain management methods like, cold and warm applications, elevation of affected body part, listening to music, watching TV, yoga, etc.Do not take any other medications unless approved by your doctor. Do not drive, operate machinery, drink alcohol, or make any important decisions while taking narcotic pain medications. Store medication in its original bottle, in a locked cabinet away from the reach of children. Dispose of any unused and  medication safely.

## 2019-12-29 ENCOUNTER — OUTPATIENT (OUTPATIENT)
Dept: OUTPATIENT SERVICES | Facility: HOSPITAL | Age: 79
LOS: 1 days | Discharge: HOME | End: 2019-12-29
Payer: MEDICARE

## 2019-12-29 DIAGNOSIS — I82.210 ACUTE EMBOLISM AND THROMBOSIS OF SUPERIOR VENA CAVA: ICD-10-CM

## 2019-12-29 DIAGNOSIS — Z98.49 CATARACT EXTRACTION STATUS, UNSPECIFIED EYE: Chronic | ICD-10-CM

## 2019-12-29 PROCEDURE — 93971 EXTREMITY STUDY: CPT | Mod: 26,RT
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
## 2019-12-31 DIAGNOSIS — Z79.1 LONG TERM (CURRENT) USE OF NON-STEROIDAL ANTI-INFLAMMATORIES (NSAID): ICD-10-CM

## 2019-12-31 DIAGNOSIS — E03.9 HYPOTHYROIDISM, UNSPECIFIED: ICD-10-CM

## 2019-12-31 DIAGNOSIS — E78.5 HYPERLIPIDEMIA, UNSPECIFIED: ICD-10-CM
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
## 2019-12-31 DIAGNOSIS — Z79.82 LONG TERM (CURRENT) USE OF ASPIRIN: ICD-10-CM

## 2019-12-31 DIAGNOSIS — B37.89 OTHER SITES OF CANDIDIASIS: ICD-10-CM

## 2019-12-31 DIAGNOSIS — M97.01XA PERIPROSTHETIC FRACTURE AROUND INTERNAL PROSTHETIC RIGHT HIP JOINT, INITIAL ENCOUNTER: ICD-10-CM

## 2019-12-31 DIAGNOSIS — S72.111A DISPLACED FRACTURE OF GREATER TROCHANTER OF RIGHT FEMUR, INITIAL ENCOUNTER FOR CLOSED FRACTURE: ICD-10-CM

## 2019-12-31 DIAGNOSIS — K21.9 GASTRO-ESOPHAGEAL REFLUX DISEASE WITHOUT ESOPHAGITIS: ICD-10-CM

## 2019-12-31 DIAGNOSIS — J44.9 CHRONIC OBSTRUCTIVE PULMONARY DISEASE, UNSPECIFIED: ICD-10-CM

## 2019-12-31 DIAGNOSIS — I10 ESSENTIAL (PRIMARY) HYPERTENSION: ICD-10-CM

## 2019-12-31 DIAGNOSIS — Z96.641 PRESENCE OF RIGHT ARTIFICIAL HIP JOINT: ICD-10-CM

## 2020-03-06 ENCOUNTER — OUTPATIENT (OUTPATIENT)
Dept: OUTPATIENT SERVICES | Facility: HOSPITAL | Age: 80
LOS: 1 days | Discharge: HOME | End: 2020-03-06
Payer: MEDICARE

## 2020-03-06 VITALS
SYSTOLIC BLOOD PRESSURE: 129 MMHG | OXYGEN SATURATION: 98 % | HEART RATE: 69 BPM | WEIGHT: 168.65 LBS | DIASTOLIC BLOOD PRESSURE: 52 MMHG | TEMPERATURE: 99 F | RESPIRATION RATE: 16 BRPM | HEIGHT: 66 IN

## 2020-03-06 DIAGNOSIS — Z96.641 PRESENCE OF RIGHT ARTIFICIAL HIP JOINT: Chronic | ICD-10-CM

## 2020-03-06 DIAGNOSIS — Z98.890 OTHER SPECIFIED POSTPROCEDURAL STATES: Chronic | ICD-10-CM

## 2020-03-06 DIAGNOSIS — Z01.818 ENCOUNTER FOR OTHER PREPROCEDURAL EXAMINATION: ICD-10-CM

## 2020-03-06 DIAGNOSIS — Z96.641 PRESENCE OF RIGHT ARTIFICIAL HIP JOINT: ICD-10-CM

## 2020-03-06 DIAGNOSIS — Z98.49 CATARACT EXTRACTION STATUS, UNSPECIFIED EYE: Chronic | ICD-10-CM

## 2020-03-06 LAB
ANION GAP SERPL CALC-SCNC: 5 MMOL/L — SIGNIFICANT CHANGE UP (ref 5–17)
APTT BLD: 29.9 SEC — SIGNIFICANT CHANGE UP (ref 28.5–37)
BLD GP AB SCN SERPL QL: SIGNIFICANT CHANGE UP
BUN SERPL-MCNC: 17 MG/DL — SIGNIFICANT CHANGE UP (ref 7–23)
CALCIUM SERPL-MCNC: 9.4 MG/DL — SIGNIFICANT CHANGE UP (ref 8.5–10.1)
CHLORIDE SERPL-SCNC: 104 MMOL/L — SIGNIFICANT CHANGE UP (ref 96–108)
CO2 SERPL-SCNC: 31 MMOL/L — SIGNIFICANT CHANGE UP (ref 22–31)
CREAT SERPL-MCNC: 0.62 MG/DL — SIGNIFICANT CHANGE UP (ref 0.5–1.3)
GLUCOSE SERPL-MCNC: 80 MG/DL — SIGNIFICANT CHANGE UP (ref 70–99)
HCT VFR BLD CALC: 33.1 % — LOW (ref 34.5–45)
HGB BLD-MCNC: 10.4 G/DL — LOW (ref 11.5–15.5)
INR BLD: 1.07 RATIO — SIGNIFICANT CHANGE UP (ref 0.88–1.16)
MCHC RBC-ENTMCNC: 31.4 GM/DL — LOW (ref 32–36)
MCHC RBC-ENTMCNC: 33.4 PG — SIGNIFICANT CHANGE UP (ref 27–34)
MCV RBC AUTO: 106.4 FL — HIGH (ref 80–100)
NRBC # BLD: 0 /100 WBCS — SIGNIFICANT CHANGE UP (ref 0–0)
PLATELET # BLD AUTO: 283 K/UL — SIGNIFICANT CHANGE UP (ref 150–400)
POTASSIUM SERPL-MCNC: 3.7 MMOL/L — SIGNIFICANT CHANGE UP (ref 3.5–5.3)
POTASSIUM SERPL-SCNC: 3.7 MMOL/L — SIGNIFICANT CHANGE UP (ref 3.5–5.3)
PROTHROM AB SERPL-ACNC: 12 SEC — SIGNIFICANT CHANGE UP (ref 10–12.9)
RBC # BLD: 3.11 M/UL — LOW (ref 3.8–5.2)
RBC # FLD: 13.3 % — SIGNIFICANT CHANGE UP (ref 10.3–14.5)
SODIUM SERPL-SCNC: 140 MMOL/L — SIGNIFICANT CHANGE UP (ref 135–145)
WBC # BLD: 7.58 K/UL — SIGNIFICANT CHANGE UP (ref 3.8–10.5)
WBC # FLD AUTO: 7.58 K/UL — SIGNIFICANT CHANGE UP (ref 3.8–10.5)

## 2020-03-06 PROCEDURE — 93010 ELECTROCARDIOGRAM REPORT: CPT

## 2020-03-06 RX ORDER — THYROID 120 MG
1 TABLET ORAL
Qty: 0 | Refills: 0 | DISCHARGE

## 2020-03-06 RX ORDER — MUPIROCIN 20 MG/G
1 OINTMENT TOPICAL
Qty: 22 | Refills: 0
Start: 2020-03-06 | End: 2020-03-10

## 2020-03-06 RX ORDER — SODIUM CHLORIDE 9 MG/ML
3 INJECTION INTRAMUSCULAR; INTRAVENOUS; SUBCUTANEOUS EVERY 8 HOURS
Refills: 0 | Status: DISCONTINUED | OUTPATIENT
Start: 2020-03-10 | End: 2020-03-10

## 2020-03-06 RX ORDER — TIOTROPIUM BROMIDE 18 UG/1
1 CAPSULE ORAL; RESPIRATORY (INHALATION)
Qty: 0 | Refills: 0 | DISCHARGE

## 2020-03-06 RX ORDER — MOMETASONE FUROATE 50 UG/1
2 SPRAY NASAL
Qty: 0 | Refills: 0 | DISCHARGE

## 2020-03-06 RX ORDER — DILTIAZEM HCL 120 MG
1 CAPSULE, EXT RELEASE 24 HR ORAL
Qty: 0 | Refills: 0 | DISCHARGE

## 2020-03-06 RX ORDER — FLUTICASONE FUROATE AND VILANTEROL TRIFENATATE 100; 25 UG/1; UG/1
1 POWDER RESPIRATORY (INHALATION)
Qty: 0 | Refills: 0 | DISCHARGE

## 2020-03-06 RX ORDER — LEVOTHYROXINE SODIUM 125 MCG
1 TABLET ORAL
Qty: 0 | Refills: 0 | DISCHARGE

## 2020-03-06 NOTE — H&P PST ADULT - HISTORY OF PRESENT ILLNESS
79 year old female hypertension  hypothyroidism, hyperlipidemia, COPD, s/p right replacement surgery on 11/19/2019, she the developed a pelvic  fracture a week later and on 12/19/2019 a pin was placed and slipped subsequently 2 thrombosis.  She is schedule for right hip debridement, evac hematoma, removal of hardware. 79 year old female with a past medical history of hypertension  hypothyroidism, hyperlipidemia, COPD, s/p right hip replacement on 11/19/2019 she developed a pelvic  fracture a week later and on 12/19/2019 a pin was placed and slipped. She has developed 2 thromboses She is schedule for right hip debridement, evac hematoma, removal of hardware on 3/10/2020. 79 year old female with a past medical history of hypertension  hypothyroidism, hyperlipidemia, COPD, s/p right hip replacement on 11/19/2019 she reports developing a pelvic  fracture a week later and on 12/19/2019 a pin was placed and slipped. She has developed 2 thromboses.  She is schedule for right hip debridement, evac hematoma, removal of hardware on 3/10/2020.

## 2020-03-06 NOTE — H&P PST ADULT - NSICDXPASTSURGICALHX_GEN_ALL_CORE_FT
PAST SURGICAL HISTORY:  H/O cataract extraction     H/O varicose vein ligation     History of right hip replacement 11/2019- intial  12/2019- pelvic fracture with clip placement. clip slip PAST SURGICAL HISTORY:  H/O cataract extraction     H/O varicose vein ligation     History of right hip replacement 11/2019- initial  12/2019- pelvic fracture with clip placement. clip slip

## 2020-03-06 NOTE — H&P PST ADULT - ASSESSMENT
79 year old female with a past medical history of hypertension  hypothyroidism, hyperlipidemia, COPD, s/p right hip replacement on 11/19/2019 she developed a pelvic  fracture a week later and on 12/19/2019 a pin was placed and slipped. She has developed 2 thromboses She is schedule for right hip debridement, evac hematoma, removal of hardware on 3/10/2020. 79 year old female with a past medical history of hypertension  hypothyroidism, hyperlipidemia, COPD, s/p right hip replacement on 2019 she reports developing a pelvic  fracture a week later and on 2019 a pin was placed and slipped. She has developed 2 thromboses.  She is schedule for right hip debridement, evac hematoma, removal of hardware on 3/10/2020.    CAPRINI SCORE [CLOT]    AGE RELATED RISK FACTORS                                                       MOBILITY RELATED FACTORS  [ ] Age 41-60 years                                            (1 Point)                  [ ] Bed rest                                                        (1 Point)  [ ] Age: 61-74 years                                           (2 Points)                 [ ] Plaster cast                                                   (2 Points)  [x ] Age= 75 years                                              (3 Points)                 [ ] Bed bound for more than 72 hours                 (2 Points)    DISEASE RELATED RISK FACTORS                                               GENDER SPECIFIC FACTORS  [ ] Edema in the lower extremities                       (1 Point)                  [ ] Pregnancy                                                     (1 Point)  [ ] Varicose veins                                               (1 Point)                  [ ] Post-partum < 6 weeks                                   (1 Point)             [ ] BMI > 25 Kg/m2                                            (1 Point)                  [ ] Hormonal therapy  or oral contraception          (1 Point)                 [ ] Sepsis (in the previous month)                        (1 Point)                  [ ] History of pregnancy complications                 (1 point)  [ ] Pneumonia or serious lung disease                                               [ ] Unexplained or recurrent                     (1 Point)           (in the previous month)                               (1 Point)  [ ] Abnormal pulmonary function test                     (1 Point)                 SURGERY RELATED RISK FACTORS  [ ] Acute myocardial infarction                              (1 Point)                 [ ]  Section                                             (1 Point)  [ ] Congestive heart failure (in the previous month)  (1 Point)               [ ] Minor surgery                                                  (1 Point)   [ ] Inflammatory bowel disease                             (1 Point)                 [ ] Arthroscopic surgery                                        (2 Points)  [ ] Central venous access                                      (2 Points)                [ ] General surgery lasting more than 45 minutes   (2 Points)       [ ] Stroke (in the previous month)                          (5 Points)               [ x] Elective arthroplasty                                         (5 Points)                                                                                                                                               HEMATOLOGY RELATED FACTORS                                                 TRAUMA RELATED RISK FACTORS  [ ] Prior episodes of VTE                                     (3 Points)                [ ] Fracture of the hip, pelvis, or leg                       (5 Points)  [ ] Positive family history for VTE                         (3 Points)                 [ ] Acute spinal cord injury (in the previous month)  (5 Points)  [ ] Prothrombin 95570 A                                     (3 Points)                 [ ] Paralysis  (less than 1 month)                             (5 Points)  [ ] Factor V Leiden                                             (3 Points)                  [ ] Multiple Trauma within 1 month                        (5 Points)  [ ] Lupus anticoagulants                                     (3 Points)                                                           [ ] Anticardiolipin antibodies                               (3 Points)                                                       [ ] High homocysteine in the blood                      (3 Points)                                             [ ] Other congenital or acquired thrombophilia      (3 Points)                                                [ ] Heparin induced thrombocytopenia                  (3 Points)                                          Total Score [       8   ]    Caprini Score 0 - 2:  Low Risk, No VTE Prophylaxis required for most patients, encourage ambulation  Caprini Score 3 - 6:  At Risk, pharmacologic VTE prophylaxis is indicated for most patients (in the absence of a contraindication)  Caprini Score Greater than or = 7:  High Risk, pharmacologic VTE prophylaxis is indicated for most patients (in the absence of a contraindication)    Caprini score indicates that the patient is high risk for VTE event ( score 6 or greater). Surgical patient's in this group will benefit from both pharmacologic prophylaxis and intermittent compression devices . Surgical team will determine the balance between VTE  risk and bleeding risk and other clinical considerations

## 2020-03-06 NOTE — H&P PST ADULT - NSICDXPASTMEDICALHX_GEN_ALL_CORE_FT
PAST MEDICAL HISTORY:  Chronic obstructive pulmonary disease, unspecified COPD type     History of atrial flutter     HLD (hyperlipidemia)     Hypothyroidism

## 2020-03-06 NOTE — H&P PST ADULT - NSANTHOSAYNRD_GEN_A_CORE
No. PATEL screening performed.  STOP BANG Legend: 0-2 = LOW Risk; 3-4 = INTERMEDIATE Risk; 5-8 = HIGH Risk

## 2020-03-06 NOTE — H&P PST ADULT - NSICDXPROBLEM_GEN_ALL_CORE_FT
PROBLEM DIAGNOSES  Problem: Presence of right artificial hip joint  Assessment and Plan: RIght hip incision and debridement, evacuation of hematoma, removal of hardware an complex wound closure on 3/10/2020    Problem: Preop examination  Assessment and Plan: labs - cbc,pt/ptt,bmp,t&s,nose cx,ekg  M/C required  preop 3 day hibiclens instruction reviewed and given .instructed on if  nose cx positive use mupuricin 5 days and checklist given  take routine meds DOS with sips of water. avoid NSAID and OTC supplements. verbalized understanding  information on proper nutrition , increase protein and better food choices provided in packet

## 2020-03-07 LAB
HBA1C BLD-MCNC: 4.8 % — SIGNIFICANT CHANGE UP (ref 4–5.6)
MRSA PCR RESULT.: SIGNIFICANT CHANGE UP
S AUREUS DNA NOSE QL NAA+PROBE: SIGNIFICANT CHANGE UP

## 2020-03-09 ENCOUNTER — TRANSCRIPTION ENCOUNTER (OUTPATIENT)
Age: 80
End: 2020-03-09

## 2020-03-10 ENCOUNTER — INPATIENT (INPATIENT)
Facility: HOSPITAL | Age: 80
LOS: 4 days | Discharge: HOME HEALTH | End: 2020-03-15
Attending: ORTHOPAEDIC SURGERY | Admitting: ORTHOPAEDIC SURGERY
Payer: MEDICARE

## 2020-03-10 ENCOUNTER — RESULT REVIEW (OUTPATIENT)
Age: 80
End: 2020-03-10

## 2020-03-10 VITALS
OXYGEN SATURATION: 99 % | WEIGHT: 168.65 LBS | TEMPERATURE: 98 F | HEIGHT: 66 IN | RESPIRATION RATE: 18 BRPM | SYSTOLIC BLOOD PRESSURE: 117 MMHG | HEART RATE: 61 BPM | DIASTOLIC BLOOD PRESSURE: 32 MMHG

## 2020-03-10 DIAGNOSIS — Z98.49 CATARACT EXTRACTION STATUS, UNSPECIFIED EYE: Chronic | ICD-10-CM

## 2020-03-10 DIAGNOSIS — Z96.641 PRESENCE OF RIGHT ARTIFICIAL HIP JOINT: Chronic | ICD-10-CM

## 2020-03-10 DIAGNOSIS — Z98.890 OTHER SPECIFIED POSTPROCEDURAL STATES: Chronic | ICD-10-CM

## 2020-03-10 LAB
BLD GP AB SCN SERPL QL: SIGNIFICANT CHANGE UP
HCT VFR BLD CALC: 29 % — LOW (ref 34.5–45)
HGB BLD-MCNC: 9.2 G/DL — LOW (ref 11.5–15.5)
MCHC RBC-ENTMCNC: 31.7 GM/DL — LOW (ref 32–36)
MCHC RBC-ENTMCNC: 33.6 PG — SIGNIFICANT CHANGE UP (ref 27–34)
MCV RBC AUTO: 105.8 FL — HIGH (ref 80–100)
NRBC # BLD: 0 /100 WBCS — SIGNIFICANT CHANGE UP (ref 0–0)
PLATELET # BLD AUTO: 184 K/UL — SIGNIFICANT CHANGE UP (ref 150–400)
RBC # BLD: 2.74 M/UL — LOW (ref 3.8–5.2)
RBC # FLD: 13.4 % — SIGNIFICANT CHANGE UP (ref 10.3–14.5)
WBC # BLD: 8 K/UL — SIGNIFICANT CHANGE UP (ref 3.8–10.5)
WBC # FLD AUTO: 8 K/UL — SIGNIFICANT CHANGE UP (ref 3.8–10.5)

## 2020-03-10 PROCEDURE — 88300 SURGICAL PATH GROSS: CPT | Mod: 26

## 2020-03-10 RX ORDER — ACETAMINOPHEN 500 MG
975 TABLET ORAL EVERY 8 HOURS
Refills: 0 | Status: COMPLETED | OUTPATIENT
Start: 2020-03-10 | End: 2020-03-13

## 2020-03-10 RX ORDER — OXYCODONE HYDROCHLORIDE 5 MG/1
10 TABLET ORAL EVERY 4 HOURS
Refills: 0 | Status: DISCONTINUED | OUTPATIENT
Start: 2020-03-10 | End: 2020-03-15

## 2020-03-10 RX ORDER — CELECOXIB 200 MG/1
200 CAPSULE ORAL DAILY
Refills: 0 | Status: DISCONTINUED | OUTPATIENT
Start: 2020-03-11 | End: 2020-03-15

## 2020-03-10 RX ORDER — DEXAMETHASONE 0.5 MG/5ML
10 ELIXIR ORAL ONCE
Refills: 0 | Status: COMPLETED | OUTPATIENT
Start: 2020-03-11 | End: 2020-03-11

## 2020-03-10 RX ORDER — LEVOTHYROXINE SODIUM 125 MCG
25 TABLET ORAL DAILY
Refills: 0 | Status: DISCONTINUED | OUTPATIENT
Start: 2020-03-10 | End: 2020-03-15

## 2020-03-10 RX ORDER — DILTIAZEM HCL 120 MG
1 CAPSULE, EXT RELEASE 24 HR ORAL
Qty: 0 | Refills: 0 | DISCHARGE

## 2020-03-10 RX ORDER — HYDROMORPHONE HYDROCHLORIDE 2 MG/ML
0.5 INJECTION INTRAMUSCULAR; INTRAVENOUS; SUBCUTANEOUS
Refills: 0 | Status: DISCONTINUED | OUTPATIENT
Start: 2020-03-10 | End: 2020-03-10

## 2020-03-10 RX ORDER — CEFAZOLIN SODIUM 1 G
2000 VIAL (EA) INJECTION EVERY 8 HOURS
Refills: 0 | Status: DISCONTINUED | OUTPATIENT
Start: 2020-03-11 | End: 2020-03-15

## 2020-03-10 RX ORDER — FLUTICASONE FUROATE AND VILANTEROL TRIFENATATE 100; 25 UG/1; UG/1
1 POWDER RESPIRATORY (INHALATION)
Qty: 0 | Refills: 0 | DISCHARGE

## 2020-03-10 RX ORDER — DILTIAZEM HCL 120 MG
120 CAPSULE, EXT RELEASE 24 HR ORAL DAILY
Refills: 0 | Status: DISCONTINUED | OUTPATIENT
Start: 2020-03-10 | End: 2020-03-15

## 2020-03-10 RX ORDER — TIOTROPIUM BROMIDE 18 UG/1
1 CAPSULE ORAL; RESPIRATORY (INHALATION)
Qty: 0 | Refills: 0 | DISCHARGE

## 2020-03-10 RX ORDER — SENNA PLUS 8.6 MG/1
2 TABLET ORAL AT BEDTIME
Refills: 0 | Status: DISCONTINUED | OUTPATIENT
Start: 2020-03-10 | End: 2020-03-15

## 2020-03-10 RX ORDER — POLYETHYLENE GLYCOL 3350 17 G/17G
17 POWDER, FOR SOLUTION ORAL DAILY
Refills: 0 | Status: DISCONTINUED | OUTPATIENT
Start: 2020-03-10 | End: 2020-03-15

## 2020-03-10 RX ORDER — FERROUS SULFATE 325(65) MG
325 TABLET ORAL
Refills: 0 | Status: DISCONTINUED | OUTPATIENT
Start: 2020-03-10 | End: 2020-03-15

## 2020-03-10 RX ORDER — SODIUM CHLORIDE 9 MG/ML
1000 INJECTION, SOLUTION INTRAVENOUS
Refills: 0 | Status: DISCONTINUED | OUTPATIENT
Start: 2020-03-10 | End: 2020-03-10

## 2020-03-10 RX ORDER — FOLIC ACID 0.8 MG
1 TABLET ORAL DAILY
Refills: 0 | Status: DISCONTINUED | OUTPATIENT
Start: 2020-03-10 | End: 2020-03-15

## 2020-03-10 RX ORDER — ACETAMINOPHEN 500 MG
1000 TABLET ORAL ONCE
Refills: 0 | Status: COMPLETED | OUTPATIENT
Start: 2020-03-10 | End: 2020-03-10

## 2020-03-10 RX ORDER — FLUTICASONE PROPIONATE 50 MCG
1 SPRAY, SUSPENSION NASAL
Qty: 0 | Refills: 0 | DISCHARGE

## 2020-03-10 RX ORDER — METOCLOPRAMIDE HCL 10 MG
10 TABLET ORAL ONCE
Refills: 0 | Status: DISCONTINUED | OUTPATIENT
Start: 2020-03-10 | End: 2020-03-10

## 2020-03-10 RX ORDER — OXYCODONE HYDROCHLORIDE 5 MG/1
5 TABLET ORAL EVERY 4 HOURS
Refills: 0 | Status: DISCONTINUED | OUTPATIENT
Start: 2020-03-10 | End: 2020-03-15

## 2020-03-10 RX ORDER — TIOTROPIUM BROMIDE 18 UG/1
1 CAPSULE ORAL; RESPIRATORY (INHALATION) DAILY
Refills: 0 | Status: DISCONTINUED | OUTPATIENT
Start: 2020-03-10 | End: 2020-03-15

## 2020-03-10 RX ORDER — LEVOTHYROXINE SODIUM 125 MCG
1 TABLET ORAL
Qty: 0 | Refills: 0 | DISCHARGE

## 2020-03-10 RX ORDER — HYDROMORPHONE HYDROCHLORIDE 2 MG/ML
0.5 INJECTION INTRAMUSCULAR; INTRAVENOUS; SUBCUTANEOUS EVERY 4 HOURS
Refills: 0 | Status: DISCONTINUED | OUTPATIENT
Start: 2020-03-10 | End: 2020-03-15

## 2020-03-10 RX ORDER — ATORVASTATIN CALCIUM 80 MG/1
10 TABLET, FILM COATED ORAL AT BEDTIME
Refills: 0 | Status: DISCONTINUED | OUTPATIENT
Start: 2020-03-10 | End: 2020-03-15

## 2020-03-10 RX ORDER — PANTOPRAZOLE SODIUM 20 MG/1
40 TABLET, DELAYED RELEASE ORAL
Refills: 0 | Status: DISCONTINUED | OUTPATIENT
Start: 2020-03-10 | End: 2020-03-15

## 2020-03-10 RX ORDER — SODIUM CHLORIDE 9 MG/ML
1000 INJECTION INTRAMUSCULAR; INTRAVENOUS; SUBCUTANEOUS
Refills: 0 | Status: DISCONTINUED | OUTPATIENT
Start: 2020-03-10 | End: 2020-03-12

## 2020-03-10 RX ORDER — MAGNESIUM HYDROXIDE 400 MG/1
30 TABLET, CHEWABLE ORAL DAILY
Refills: 0 | Status: DISCONTINUED | OUTPATIENT
Start: 2020-03-10 | End: 2020-03-15

## 2020-03-10 RX ORDER — ASPIRIN/CALCIUM CARB/MAGNESIUM 324 MG
325 TABLET ORAL
Refills: 0 | Status: DISCONTINUED | OUTPATIENT
Start: 2020-03-11 | End: 2020-03-15

## 2020-03-10 RX ORDER — THYROID 120 MG
1 TABLET ORAL
Qty: 0 | Refills: 0 | DISCHARGE

## 2020-03-10 RX ORDER — ONDANSETRON 8 MG/1
4 TABLET, FILM COATED ORAL EVERY 6 HOURS
Refills: 0 | Status: DISCONTINUED | OUTPATIENT
Start: 2020-03-10 | End: 2020-03-15

## 2020-03-10 RX ADMIN — ATORVASTATIN CALCIUM 10 MILLIGRAM(S): 80 TABLET, FILM COATED ORAL at 22:34

## 2020-03-10 RX ADMIN — Medication 975 MILLIGRAM(S): at 23:32

## 2020-03-10 RX ADMIN — Medication 1000 MILLIGRAM(S): at 19:57

## 2020-03-10 RX ADMIN — SODIUM CHLORIDE 75 MILLILITER(S): 9 INJECTION, SOLUTION INTRAVENOUS at 19:10

## 2020-03-10 RX ADMIN — Medication 400 MILLIGRAM(S): at 19:20

## 2020-03-10 RX ADMIN — Medication 975 MILLIGRAM(S): at 22:34

## 2020-03-10 RX ADMIN — SODIUM CHLORIDE 100 MILLILITER(S): 9 INJECTION INTRAMUSCULAR; INTRAVENOUS; SUBCUTANEOUS at 22:36

## 2020-03-10 RX ADMIN — SODIUM CHLORIDE 3 MILLILITER(S): 9 INJECTION INTRAMUSCULAR; INTRAVENOUS; SUBCUTANEOUS at 19:27

## 2020-03-10 NOTE — ASU PREOP CHECKLIST - NS PREOP CHK CHLOROHEX WASH
in ASU:
I personally performed the service described in the documentation recorded by the scribe in my presence, and it accurately and completely records my words and actions.

## 2020-03-10 NOTE — PROGRESS NOTE ADULT - SUBJECTIVE AND OBJECTIVE BOX
Patient is seen and examined at bedside. Denies CP/SOB/Dizziness/N/V/D/HA. Pain is well-controlled.     Vital Signs Last 24 Hrs  T(C): 36.3 (10 Mar 2020 22:30), Max: 36.5 (10 Mar 2020 13:16)  T(F): 97.4 (10 Mar 2020 22:30), Max: 97.7 (10 Mar 2020 13:16)  HR: 80 (10 Mar 2020 22:30) (57 - 80)  BP: 113/53 (10 Mar 2020 22:30) (103/62 - 117/43)  RR: 16 (10 Mar 2020 22:30) (16 - 25)  SpO2: 99% (10 Mar 2020 22:30) (97% - 100%)    GEN: NAD  Neurologic: AAOx3; CNS grossly intact; no focal deficits  RLE: Prineo dressing C/D/I. Abduction pillow in place.  Motor intact +EHL/FHL/TA/GS. Sensation is grossly intact.  Extremities warm. Compartments soft, compressible. No calf tenderness. DP 2+.  Hemovac: 30cc sanguinous output. PEDRO: 30cc sanguinous output.    LLE:  Motor intact + EHL/FHL/TA/GS. Sensation is grossly intact. Extremities warm. Compartments soft, compressible. No calf tenderness.. DP 2+.    Labs:                          9.2    8.00  )-----------( 184      ( 10 Mar 2020 20:11 )             29.0               A/P: Patient is a 79y y/o Female s/p R Hip I&D/GERARDO.    -wound care, isometric exercises, GI motility, new medications, hip precautions, hospital course and discharge planning reviewed with pt  -Pain control/analgesia  -Inc spirometry reviewed and counseled  -Venodynes/foot pumps  -F/U AM Labs  -PT/OT/WBAT  -Antibiotic per SCIPS, Ancef   -Anticoagulation - ASA   -D/c planning in AM POST-OP CHECK  Patient is seen and examined at bedside. Denies CP/SOB/Dizziness/N/V/D/HA. Pain is well-controlled.     Vital Signs Last 24 Hrs  T(C): 36.3 (10 Mar 2020 22:30), Max: 36.5 (10 Mar 2020 13:16)  T(F): 97.4 (10 Mar 2020 22:30), Max: 97.7 (10 Mar 2020 13:16)  HR: 80 (10 Mar 2020 22:30) (57 - 80)  BP: 113/53 (10 Mar 2020 22:30) (103/62 - 117/43)  RR: 16 (10 Mar 2020 22:30) (16 - 25)  SpO2: 99% (10 Mar 2020 22:30) (97% - 100%)    GEN: NAD  Neurologic: AAOx3; CNS grossly intact; no focal deficits  RLE: Prineo dressing C/D/I. Abduction pillow in place.  Motor intact +EHL/FHL/TA/GS. Sensation is grossly intact.  Extremities warm. Compartments soft, compressible. No calf tenderness. DP 2+.  Hemovac: 30cc sanguinous output. PEDRO: 30cc sanguinous output.    LLE:  Motor intact + EHL/FHL/TA/GS. Sensation is grossly intact. Extremities warm. Compartments soft, compressible. No calf tenderness.. DP 2+.    Labs:                          9.2    8.00  )-----------( 184      ( 10 Mar 2020 20:11 )             29.0               A/P: Patient is a 79y y/o Female s/p R Hip I&D/GERARDO.    -wound care, isometric exercises, GI motility, new medications, hip precautions, hospital course and discharge planning reviewed with pt  -Pain control/analgesia  -Inc spirometry reviewed and counseled  -Venodynes/foot pumps  -F/U AM Labs  -PT/OT/WBAT  -Antibiotic per SCIPS, Ancef   -Anticoagulation - ASA   -D/c planning in AM

## 2020-03-10 NOTE — BRIEF OPERATIVE NOTE - NSICDXBRIEFPROCEDURE_GEN_ALL_CORE_FT
PROCEDURES:  Incision and drainage, acetabulum, with hip irrigation and debridement 10-Mar-2020 19:14:04  Anjel Skinner

## 2020-03-11 LAB
ANION GAP SERPL CALC-SCNC: 8 MMOL/L — SIGNIFICANT CHANGE UP (ref 5–17)
BUN SERPL-MCNC: 23 MG/DL — SIGNIFICANT CHANGE UP (ref 7–23)
CALCIUM SERPL-MCNC: 8.8 MG/DL — SIGNIFICANT CHANGE UP (ref 8.5–10.1)
CHLORIDE SERPL-SCNC: 108 MMOL/L — SIGNIFICANT CHANGE UP (ref 96–108)
CO2 SERPL-SCNC: 24 MMOL/L — SIGNIFICANT CHANGE UP (ref 22–31)
CREAT SERPL-MCNC: 0.63 MG/DL — SIGNIFICANT CHANGE UP (ref 0.5–1.3)
GLUCOSE SERPL-MCNC: 145 MG/DL — HIGH (ref 70–99)
GRAM STN FLD: SIGNIFICANT CHANGE UP
HCT VFR BLD CALC: 25.9 % — LOW (ref 34.5–45)
HGB BLD-MCNC: 8.4 G/DL — LOW (ref 11.5–15.5)
MCHC RBC-ENTMCNC: 32.4 GM/DL — SIGNIFICANT CHANGE UP (ref 32–36)
MCHC RBC-ENTMCNC: 33.9 PG — SIGNIFICANT CHANGE UP (ref 27–34)
MCV RBC AUTO: 104.4 FL — HIGH (ref 80–100)
NRBC # BLD: 0 /100 WBCS — SIGNIFICANT CHANGE UP (ref 0–0)
PLATELET # BLD AUTO: 247 K/UL — SIGNIFICANT CHANGE UP (ref 150–400)
POTASSIUM SERPL-MCNC: 4.1 MMOL/L — SIGNIFICANT CHANGE UP (ref 3.5–5.3)
POTASSIUM SERPL-SCNC: 4.1 MMOL/L — SIGNIFICANT CHANGE UP (ref 3.5–5.3)
RBC # BLD: 2.48 M/UL — LOW (ref 3.8–5.2)
RBC # FLD: 13.2 % — SIGNIFICANT CHANGE UP (ref 10.3–14.5)
SODIUM SERPL-SCNC: 140 MMOL/L — SIGNIFICANT CHANGE UP (ref 135–145)
SPECIMEN SOURCE: SIGNIFICANT CHANGE UP
WBC # BLD: 5.72 K/UL — SIGNIFICANT CHANGE UP (ref 3.8–10.5)
WBC # FLD AUTO: 5.72 K/UL — SIGNIFICANT CHANGE UP (ref 3.8–10.5)

## 2020-03-11 PROCEDURE — 72170 X-RAY EXAM OF PELVIS: CPT | Mod: 26

## 2020-03-11 RX ORDER — ATORVASTATIN CALCIUM 80 MG/1
10 TABLET, FILM COATED ORAL AT BEDTIME
Refills: 0 | Status: DISCONTINUED | OUTPATIENT
Start: 2020-03-11 | End: 2020-03-11

## 2020-03-11 RX ORDER — DILTIAZEM HCL 120 MG
120 CAPSULE, EXT RELEASE 24 HR ORAL DAILY
Refills: 0 | Status: DISCONTINUED | OUTPATIENT
Start: 2020-03-11 | End: 2020-03-11

## 2020-03-11 RX ORDER — LACTOBACILLUS ACIDOPHILUS 100MM CELL
1 CAPSULE ORAL
Refills: 0 | Status: DISCONTINUED | OUTPATIENT
Start: 2020-03-11 | End: 2020-03-15

## 2020-03-11 RX ORDER — SODIUM CHLORIDE 9 MG/ML
1000 INJECTION INTRAMUSCULAR; INTRAVENOUS; SUBCUTANEOUS ONCE
Refills: 0 | Status: COMPLETED | OUTPATIENT
Start: 2020-03-11 | End: 2020-03-11

## 2020-03-11 RX ORDER — TIOTROPIUM BROMIDE 18 UG/1
1 CAPSULE ORAL; RESPIRATORY (INHALATION) DAILY
Refills: 0 | Status: DISCONTINUED | OUTPATIENT
Start: 2020-03-11 | End: 2020-03-11

## 2020-03-11 RX ADMIN — Medication 1 TABLET(S): at 11:35

## 2020-03-11 RX ADMIN — SODIUM CHLORIDE 500 MILLILITER(S): 9 INJECTION INTRAMUSCULAR; INTRAVENOUS; SUBCUTANEOUS at 07:32

## 2020-03-11 RX ADMIN — Medication 1 TABLET(S): at 18:40

## 2020-03-11 RX ADMIN — SODIUM CHLORIDE 100 MILLILITER(S): 9 INJECTION INTRAMUSCULAR; INTRAVENOUS; SUBCUTANEOUS at 19:00

## 2020-03-11 RX ADMIN — OXYCODONE HYDROCHLORIDE 10 MILLIGRAM(S): 5 TABLET ORAL at 23:00

## 2020-03-11 RX ADMIN — Medication 325 MILLIGRAM(S): at 18:02

## 2020-03-11 RX ADMIN — Medication 325 MILLIGRAM(S): at 06:22

## 2020-03-11 RX ADMIN — POLYETHYLENE GLYCOL 3350 17 GRAM(S): 17 POWDER, FOR SOLUTION ORAL at 11:35

## 2020-03-11 RX ADMIN — Medication 325 MILLIGRAM(S): at 11:35

## 2020-03-11 RX ADMIN — Medication 975 MILLIGRAM(S): at 07:20

## 2020-03-11 RX ADMIN — Medication 975 MILLIGRAM(S): at 06:22

## 2020-03-11 RX ADMIN — ATORVASTATIN CALCIUM 10 MILLIGRAM(S): 80 TABLET, FILM COATED ORAL at 22:04

## 2020-03-11 RX ADMIN — CELECOXIB 200 MILLIGRAM(S): 200 CAPSULE ORAL at 11:35

## 2020-03-11 RX ADMIN — Medication 975 MILLIGRAM(S): at 15:13

## 2020-03-11 RX ADMIN — Medication 102 MILLIGRAM(S): at 06:22

## 2020-03-11 RX ADMIN — Medication 975 MILLIGRAM(S): at 22:05

## 2020-03-11 RX ADMIN — Medication 100 MILLIGRAM(S): at 18:01

## 2020-03-11 RX ADMIN — PANTOPRAZOLE SODIUM 40 MILLIGRAM(S): 20 TABLET, DELAYED RELEASE ORAL at 06:22

## 2020-03-11 RX ADMIN — Medication 25 MICROGRAM(S): at 06:22

## 2020-03-11 RX ADMIN — CELECOXIB 200 MILLIGRAM(S): 200 CAPSULE ORAL at 12:35

## 2020-03-11 RX ADMIN — Medication 325 MILLIGRAM(S): at 07:52

## 2020-03-11 RX ADMIN — Medication 100 MILLIGRAM(S): at 00:09

## 2020-03-11 RX ADMIN — TIOTROPIUM BROMIDE 1 CAPSULE(S): 18 CAPSULE ORAL; RESPIRATORY (INHALATION) at 11:36

## 2020-03-11 RX ADMIN — Medication 975 MILLIGRAM(S): at 14:13

## 2020-03-11 RX ADMIN — Medication 120 MILLIGRAM(S): at 06:22

## 2020-03-11 RX ADMIN — Medication 975 MILLIGRAM(S): at 23:00

## 2020-03-11 RX ADMIN — OXYCODONE HYDROCHLORIDE 10 MILLIGRAM(S): 5 TABLET ORAL at 22:04

## 2020-03-11 RX ADMIN — Medication 1 MILLIGRAM(S): at 11:35

## 2020-03-11 RX ADMIN — Medication 100 MILLIGRAM(S): at 08:57

## 2020-03-11 NOTE — OCCUPATIONAL THERAPY INITIAL EVALUATION ADULT - LIVES WITH, PROFILE
in a private house with  3 entry steps in the front , equipped with right hand rail. Once inside, pt has to negotiate a flight of stairs with 6 steps + a landing and another 6 steps with right ascending hand rail  to access the bedroom and bathroom./spouse in a private house with 3 entry steps in the front , equipped with right hand rail. Once inside, pt has to negotiate a flight of stairs with 6 steps + a landing and another 6 steps with right ascending hand rail  to access the bedroom and bathroom. The bathroom has a tub/shower combination, retractable shower head,  grab bars and standard toilet. The toilet has adequate space to fit a commode over it./spouse

## 2020-03-11 NOTE — PROGRESS NOTE ADULT - SUBJECTIVE AND OBJECTIVE BOX
Patient is seen and examined at bedside. Denies CP/SOB/Dizziness/N/V/D/HA. Pain is controlled.     Vital Signs Last 24 Hrs  T(C): 36.1 (11 Mar 2020 06:18), Max: 36.5 (10 Mar 2020 13:16)  T(F): 97 (11 Mar 2020 06:18), Max: 97.7 (10 Mar 2020 13:16)  HR: 74 (11 Mar 2020 09:30) (57 - 80)  BP: 125/53 (11 Mar 2020 09:30) (98/50 - 125/53)  BP(mean): --  RR: 16 (11 Mar 2020 06:18) (16 - 25)  SpO2: 100% (11 Mar 2020 09:30) (97% - 100%)    GEN: NAD  ABD: soft, NT/ND; no rebound or guarding;  Neurologic: AAOx3; CNS grossly intact; no focal deficits  LE: Dressing C/D/I. abduction pillow in place  RLE Motor intact + EHL/FHL/TA/GS. Sensation is grossly intact.  Extremities warm. . Compartments soft, compressible. No calf tenderness. DP 2+.  LLE:  Motor intact + EHL/FHL/TA/GS. Sensation is grossly intact. Extremities warm. Compartments soft, compressible. No calf tenderness.. DP 2+.    Labs:                          8.4    5.72  )-----------( 247      ( 11 Mar 2020 06:37 )             25.9       03-11    140  |  108  |  23  ----------------------------<  145<H>  4.1   |  24  |  0.63    Ca    8.8      11 Mar 2020 06:37        A/P: Patient is a 79y y/o Female s/p right hip I&D/head and liner exchange/evacuation of hematoma, POD # 1  -wound care, isometric exercises, GI motility, new medications, hip precautions,  hospital course and discharge planning reviewed with pt  -Pain control/analgesia - adequate  -Inc spirometry reviewed and counseled  -Monitor HV/PEDRO drain output. Can DC HV when less than 30cc/shift X 2 consecutive shifts, can DC PEDRO when less than 20cc/shift X 2 consecutive shifts per Dr Haynes  -Venodynes/foot pumps  -F/U OR cultures - Ancef continuous pending OR cultures  -PT/OT/WBAT  -Monitor post op H&H. Will transfuse if symptomatic and Hg drops to 7.  -Anticoagulation: ASA 325mg BID  -DC home when drains out and OR cultures final.   -Pt seen in am with Dr Haynes

## 2020-03-11 NOTE — OCCUPATIONAL THERAPY INITIAL EVALUATION ADULT - ADL RETRAINING, OT EVAL
Pt will perform all aspects of lower body  self care tasks independently with set up within 4 week with use of hip and full adherence to posterior total precautions.

## 2020-03-11 NOTE — PHYSICAL THERAPY INITIAL EVALUATION ADULT - RANGE OF MOTION, PT EVAL
GOAL: Patient will demonstrate >/= to 90 degree knee flexion and 0 degree knee extension to right side in </= 4 weeks for ease in transfers and efficiency c gait.

## 2020-03-11 NOTE — CONSULT NOTE ADULT - SUBJECTIVE AND OBJECTIVE BOX
WHITNEY DICKINSON is a 79y Female s/p RIGHT HIP INCISION AND DEBRIDEMENT EVACUATION OF HEMATOMA RE   by Dr. Haynes on 3/10/20. complains of postop pain; patient tolerated surgery well.    PMH:  w/ h/o HLD (hyperlipidemia)  Hypothyroidism  History of atrial flutter  Chronic obstructive pulmonary disease, unspecified COPD type    ROS: no fevers, chills, headache, dizziness, lightheadedness, chest pain, palpitations, shortness of breath, cough, phlegm, wheezing, abdominal pain, nausea, vomiting, diarrhea, constipation or urinary symptoms     PSH: H/O varicose vein ligation  History of right hip replacement  H/O cataract extraction    No pertinent family history in first degree relatives    SH: does not smoke or drink at this time    No Known Allergies    MEDS: acetaminophen   Tablet .. 975 milliGRAM(s) Oral every 8 hours  aluminum hydroxide/magnesium hydroxide/simethicone Suspension 30 milliLiter(s) Oral four times a day PRN  aspirin enteric coated 325 milliGRAM(s) Oral two times a day  atorvastatin 10 milliGRAM(s) Oral at bedtime  ceFAZolin   IVPB 2000 milliGRAM(s) IV Intermittent every 8 hours  celecoxib 200 milliGRAM(s) Oral daily  diltiazem    milliGRAM(s) Oral daily  ferrous    sulfate 325 milliGRAM(s) Oral three times a day with meals  folic acid 1 milliGRAM(s) Oral daily  HYDROmorphone  Injectable 0.5 milliGRAM(s) IV Push every 4 hours PRN  levothyroxine 25 MICROGram(s) Oral daily  magnesium hydroxide Suspension 30 milliLiter(s) Oral daily PRN  multivitamin 1 Tablet(s) Oral daily  ondansetron Injectable 4 milliGRAM(s) IV Push every 6 hours PRN  oxyCODONE    IR 5 milliGRAM(s) Oral every 4 hours PRN  oxyCODONE    IR 10 milliGRAM(s) Oral every 4 hours PRN  pantoprazole    Tablet 40 milliGRAM(s) Oral before breakfast  polyethylene glycol 3350 17 Gram(s) Oral daily  senna 2 Tablet(s) Oral at bedtime PRN  sodium chloride 0.9% Bolus 1000 milliLiter(s) IV Bolus once  sodium chloride 0.9%. 1000 milliLiter(s) IV Continuous <Continuous>  tiotropium 18 MICROgram(s) Capsule 1 Capsule(s) Inhalation daily    PHYS: T(C): 36.1 (03-11-20 @ 06:18), Max: 36.5 (03-10-20 @ 13:16)  HR: 71 (03-11-20 @ 06:18) (57 - 80)  BP: 105/54 (03-11-20 @ 06:18) (98/50 - 117/43)  RR: 16 (03-11-20 @ 06:18) (16 - 25)  SpO2: 100% (03-11-20 @ 06:18) (97% - 100%)  HEENT unremarkable  neck no JVD or bruit  lungs, clear bilaterally  heart, regular rhythm, normal S1, S2, no murmurs, rubs or gallops  abdomen, soft, non tender, no organomegaly, normal bowel sounds  no cyanosis, clubbing, edema or calf tenderness  neuro, grossly normal                        8.4    5.72  )-----------( 247      ( 11 Mar 2020 06:37 )             25.9       Assessment and Plan: status post right total hip replacement; Hypertension; hyperlipidemia; history of atrial flutter; chronic obstructive pulmonary disease; postop anemia; post op hypotension; pain control; deep vein thrombophlebitis prophylaxis; physical therapy; bowel regimen; nutrition support; follow up labs; will follow.

## 2020-03-11 NOTE — PHYSICAL THERAPY INITIAL EVALUATION ADULT - PERTINENT HX OF CURRENT PROBLEM, REHAB EVAL
Patients/p elective right prosthetic hip incision and drainage, liner & head exchange c hematoma evacuation, POD 1. Patient has history of right total hip arthroplasty on 11/19/2019. She reported developing a pelvic fracture a week later; and on 12/19/2019 a pin was placed and slipped. She has developed 2 thromboses and developed hematoma. Today noted to have downtrending hemoglobin but not critically low, stable for PT. Patient enumerates all of hip precautions independently.

## 2020-03-11 NOTE — PHYSICAL THERAPY INITIAL EVALUATION ADULT - GAIT TRAINING, PT EVAL
GOAL: Patient will demonstrate independent and safe gait indoors with appropriate mobility/adaptive devices for =/> 500 feet, in 4 weeks.

## 2020-03-11 NOTE — OCCUPATIONAL THERAPY INITIAL EVALUATION ADULT - PERSONAL SAFETY AND JUDGMENT, REHAB EVAL
intact/however, pt needs reminders to safely incorporate THP with ADL., functional mobility and to avoid internal rotation of right hip during turns./impaired

## 2020-03-11 NOTE — OCCUPATIONAL THERAPY INITIAL EVALUATION ADULT - PRECAUTIONS/LIMITATIONS, REHAB EVAL
bilateral hip precautions/monitor vital signs with activities because pt's BP runs low./fall precautions

## 2020-03-11 NOTE — PHYSICAL THERAPY INITIAL EVALUATION ADULT - ADDITIONAL COMMENTS
Per patient, lives c spouse in private house c 3 stair steps to enter c right handrail. Has another set of stairs internally to 2nd floor (6 and 6) also with handrail to left. Patient owns a cane, rolling walker and commode -- all in good condition. Patient reports had been going to outpatient PT prior to admission. Denies falls in past 6 months.

## 2020-03-11 NOTE — OCCUPATIONAL THERAPY INITIAL EVALUATION ADULT - RANGE OF MOTION EXAMINATION, LOWER EXTREMITY
Left LE Active ROM was WNL  (within normal limits)/ROM is limited in right hip due to pain and s/p I & D/Left LE Passive ROM was WNL (within normal limits)

## 2020-03-11 NOTE — PHYSICAL THERAPY INITIAL EVALUATION ADULT - IMPAIRMENTS FOUND, PT EVAL
joint integrity and mobility/muscle strength/aerobic capacity/endurance/gait, locomotion, and balance/ROM

## 2020-03-11 NOTE — OCCUPATIONAL THERAPY INITIAL EVALUATION ADULT - PERTINENT HX OF CURRENT PROBLEM, REHAB EVAL
Pt was admitted for right hip incision debridement , evacuation of hematoma with head liner exhange. Pt had right THR in November and sustained fractures of right hip which required surgical intervention . Pt was later discharge to [facility] rehab and subsequently home after 3 weeks Pt was admitted for right hip incision debridement , evacuation of hematoma with head liner exhange. Pt had right THR in November and sustained  a fracture of right hip which required surgical intervention . Pt was later discharge to [facility] rehab and subsequently home after 3 weeks

## 2020-03-11 NOTE — OCCUPATIONAL THERAPY INITIAL EVALUATION ADULT - GENERAL OBSERVATIONS, REHAB EVAL
Pt was seen for initial OT consult, encountered in bed in NAD with IV infusing in LUE. Abduction pillow & pnuematic teds donned Pt was AA&Ox4, cooperative & followed commands. Pt denied pain despite  s/p right hip I& D with liner head exchange. Posterior THP were reviewed & maintained. Pt presents with decreased activity tolerance  ,balance, ADL management & functional mobility Pt was seen for initial OT consult, encountered in bed in NAD with IV infusing in LUE. Abduction pillow & pneumatic teds donned Pt was AA&Ox4, cooperative & followed commands. Pt denied pain despite  s/p right hip I& D with liner head exchange. Posterior THP were reviewed & maintained. Pt presents with decreased activity tolerance  ,balance, ADL management & functional mobility

## 2020-03-11 NOTE — OCCUPATIONAL THERAPY INITIAL EVALUATION ADULT - PLANNED THERAPY INTERVENTIONS, OT EVAL
IADL retraining/energy conservation techniques/transfer training/bed mobility training/ROM/strengthening/stretching/ADL retraining/balance training/parent/caregiver training...

## 2020-03-11 NOTE — OCCUPATIONAL THERAPY INITIAL EVALUATION ADULT - ADDITIONAL COMMENTS
Prior to admission, pt was functioning in her roles, self sufficient & ambulating independently with in the community with a SAC. Presently pt needs more assistance with lower body self care tasks due to pain, weakness, stiffness and decreased ROM from s/p I&D of right hip with head liner exhage. Pt is right hand dominant and wears glasses for reading. . Prior to admission, pt was functioning in her roles, self sufficient & ambulating independently with in the community with a SAC. Presently pt needs more assistance with lower body self care tasks due to pain, weakness, stiffness and decreased ROM from s/p I&D of right hip with head liner exchange. Pt is right hand dominant and wears glasses for reading. .

## 2020-03-12 ENCOUNTER — TRANSCRIPTION ENCOUNTER (OUTPATIENT)
Age: 80
End: 2020-03-12

## 2020-03-12 LAB
ANION GAP SERPL CALC-SCNC: 10 MMOL/L — SIGNIFICANT CHANGE UP (ref 5–17)
BUN SERPL-MCNC: 25 MG/DL — HIGH (ref 7–23)
CALCIUM SERPL-MCNC: 8.4 MG/DL — LOW (ref 8.5–10.1)
CHLORIDE SERPL-SCNC: 111 MMOL/L — HIGH (ref 96–108)
CO2 SERPL-SCNC: 23 MMOL/L — SIGNIFICANT CHANGE UP (ref 22–31)
CREAT SERPL-MCNC: 0.63 MG/DL — SIGNIFICANT CHANGE UP (ref 0.5–1.3)
GLUCOSE SERPL-MCNC: 134 MG/DL — HIGH (ref 70–99)
HCT VFR BLD CALC: 24.6 % — LOW (ref 34.5–45)
HGB BLD-MCNC: 7.9 G/DL — LOW (ref 11.5–15.5)
MCHC RBC-ENTMCNC: 32.1 GM/DL — SIGNIFICANT CHANGE UP (ref 32–36)
MCHC RBC-ENTMCNC: 33.8 PG — SIGNIFICANT CHANGE UP (ref 27–34)
MCV RBC AUTO: 105.1 FL — HIGH (ref 80–100)
NRBC # BLD: 0 /100 WBCS — SIGNIFICANT CHANGE UP (ref 0–0)
PLATELET # BLD AUTO: 286 K/UL — SIGNIFICANT CHANGE UP (ref 150–400)
POTASSIUM SERPL-MCNC: 4.1 MMOL/L — SIGNIFICANT CHANGE UP (ref 3.5–5.3)
POTASSIUM SERPL-SCNC: 4.1 MMOL/L — SIGNIFICANT CHANGE UP (ref 3.5–5.3)
RBC # BLD: 2.34 M/UL — LOW (ref 3.8–5.2)
RBC # FLD: 13.5 % — SIGNIFICANT CHANGE UP (ref 10.3–14.5)
SODIUM SERPL-SCNC: 144 MMOL/L — SIGNIFICANT CHANGE UP (ref 135–145)
SURGICAL PATHOLOGY STUDY: SIGNIFICANT CHANGE UP
WBC # BLD: 14.3 K/UL — HIGH (ref 3.8–10.5)
WBC # FLD AUTO: 14.3 K/UL — HIGH (ref 3.8–10.5)

## 2020-03-12 RX ORDER — ZALEPLON 10 MG
5 CAPSULE ORAL AT BEDTIME
Refills: 0 | Status: DISCONTINUED | OUTPATIENT
Start: 2020-03-12 | End: 2020-03-15

## 2020-03-12 RX ORDER — ZOLPIDEM TARTRATE 10 MG/1
5 TABLET ORAL AT BEDTIME
Refills: 0 | Status: DISCONTINUED | OUTPATIENT
Start: 2020-03-12 | End: 2020-03-12

## 2020-03-12 RX ADMIN — Medication 975 MILLIGRAM(S): at 14:02

## 2020-03-12 RX ADMIN — ATORVASTATIN CALCIUM 10 MILLIGRAM(S): 80 TABLET, FILM COATED ORAL at 21:06

## 2020-03-12 RX ADMIN — TIOTROPIUM BROMIDE 1 CAPSULE(S): 18 CAPSULE ORAL; RESPIRATORY (INHALATION) at 14:02

## 2020-03-12 RX ADMIN — Medication 1 TABLET(S): at 05:39

## 2020-03-12 RX ADMIN — Medication 1 TABLET(S): at 17:23

## 2020-03-12 RX ADMIN — Medication 975 MILLIGRAM(S): at 22:06

## 2020-03-12 RX ADMIN — PANTOPRAZOLE SODIUM 40 MILLIGRAM(S): 20 TABLET, DELAYED RELEASE ORAL at 05:39

## 2020-03-12 RX ADMIN — Medication 975 MILLIGRAM(S): at 21:06

## 2020-03-12 RX ADMIN — Medication 100 MILLIGRAM(S): at 08:14

## 2020-03-12 RX ADMIN — Medication 975 MILLIGRAM(S): at 05:39

## 2020-03-12 RX ADMIN — Medication 1 MILLIGRAM(S): at 12:07

## 2020-03-12 RX ADMIN — Medication 100 MILLIGRAM(S): at 16:40

## 2020-03-12 RX ADMIN — Medication 25 MICROGRAM(S): at 05:40

## 2020-03-12 RX ADMIN — Medication 100 MILLIGRAM(S): at 00:27

## 2020-03-12 RX ADMIN — Medication 325 MILLIGRAM(S): at 17:23

## 2020-03-12 RX ADMIN — Medication 1 TABLET(S): at 12:08

## 2020-03-12 RX ADMIN — CELECOXIB 200 MILLIGRAM(S): 200 CAPSULE ORAL at 14:29

## 2020-03-12 RX ADMIN — CELECOXIB 200 MILLIGRAM(S): 200 CAPSULE ORAL at 12:07

## 2020-03-12 RX ADMIN — Medication 325 MILLIGRAM(S): at 08:14

## 2020-03-12 RX ADMIN — OXYCODONE HYDROCHLORIDE 10 MILLIGRAM(S): 5 TABLET ORAL at 13:45

## 2020-03-12 RX ADMIN — Medication 120 MILLIGRAM(S): at 08:16

## 2020-03-12 RX ADMIN — OXYCODONE HYDROCHLORIDE 10 MILLIGRAM(S): 5 TABLET ORAL at 12:54

## 2020-03-12 RX ADMIN — Medication 975 MILLIGRAM(S): at 06:30

## 2020-03-12 RX ADMIN — Medication 325 MILLIGRAM(S): at 12:08

## 2020-03-12 RX ADMIN — Medication 325 MILLIGRAM(S): at 17:22

## 2020-03-12 RX ADMIN — Medication 975 MILLIGRAM(S): at 14:29

## 2020-03-12 RX ADMIN — Medication 325 MILLIGRAM(S): at 05:39

## 2020-03-12 NOTE — PROGRESS NOTE ADULT - SUBJECTIVE AND OBJECTIVE BOX
Patient is seen and examined at bedside. Denies CP/SOB/Dizziness/N/V/D/HA. Pain is controlled.     Vital Signs Last 24 Hrs  T(C): 36.4 (12 Mar 2020 10:48), Max: 36.6 (11 Mar 2020 19:12)  T(F): 97.5 (12 Mar 2020 10:48), Max: 97.9 (12 Mar 2020 03:00)  HR: 62 (12 Mar 2020 11:36) (59 - 101)  BP: 149/55 (12 Mar 2020 11:36) (105/43 - 149/55)  BP(mean): --  RR: 18 (12 Mar 2020 11:36) (16 - 18)  SpO2: 99% (12 Mar 2020 11:36) (98% - 100%)    GEN: NAD  ABD: soft, NT/ND; no rebound or guarding;  Neurologic: AAOx3; CNS grossly intact; no focal deficits  RLE: Prineo dressing C/D/I. Motor intact + EHL/FHL/TA/GS. Sensation is grossly intact.  Extremities warm. . Compartments soft, compressible. No calf tenderness. DP 2+.  LLE:  Motor intact + EHL/FHL/TA/GS. Sensation is grossly intact. Extremities warm. Compartments soft, compressible. No calf tenderness.. DP 2+.    Labs:                          7.9    14.30 )-----------( 286      ( 12 Mar 2020 07:09 )             24.6       03-12    144  |  111<H>  |  25<H>  ----------------------------<  134<H>  4.1   |  23  |  0.63    Ca    8.4<L>      12 Mar 2020 07:09        A/P: Patient is a 79y y/o Female s/p right hip evacuation hematoma/I&D/GERARDO/Head and liner exchange, POD # 1  -wound care, isometric exercises, GI motility, new medications, hip precautions,  hospital course and discharge planning reviewed with pt  -Pain control/analgesia - adequate  -Transfuse 1 unit PRBC's for symptomatic acute post op anemia secondary to intraoperative blood loss.   -Inc spirometry reviewed and counseled  -Venodynes/foot pumps  -PT/OT/WBAT  -Anticoagulation: ASA 325mg BID  -Pt seen in am with DR Bernal  -MONIKA plan: home tomorrow pending am labs. Patient is seen and examined at bedside. Denies CP/SOB/Dizziness/N/V/D/HA. Pain is controlled.   HV: 45/110  PEDRO 55/175      Vital Signs Last 24 Hrs  T(C): 36.4 (12 Mar 2020 10:48), Max: 36.6 (11 Mar 2020 19:12)  T(F): 97.5 (12 Mar 2020 10:48), Max: 97.9 (12 Mar 2020 03:00)  HR: 62 (12 Mar 2020 11:36) (59 - 101)  BP: 149/55 (12 Mar 2020 11:36) (105/43 - 149/55)  BP(mean): --  RR: 18 (12 Mar 2020 11:36) (16 - 18)  SpO2: 99% (12 Mar 2020 11:36) (98% - 100%)    GEN: NAD  ABD: soft, NT/ND; no rebound or guarding;  Neurologic: AAOx3; CNS grossly intact; no focal deficits  RLE: Prineo dressing C/D/I. HV/PEDRO dressing site saturated sanguinous. Drain insertion sites no active drainage. Motor intact + EHL/FHL/TA/GS. Sensation is grossly intact.  Extremities warm. Compartments soft, compressible. No calf tenderness. DP 2+.  LLE:  Motor intact + EHL/FHL/TA/GS. Sensation is grossly intact. Extremities warm. Compartments soft, compressible. No calf tenderness.. DP 2+.    Labs:                          7.9    14.30 )-----------( 286      ( 12 Mar 2020 07:09 )             24.6       03-12    144  |  111<H>  |  25<H>  ----------------------------<  134<H>  4.1   |  23  |  0.63    Ca    8.4<L>      12 Mar 2020 07:09        A/P: Patient is a 79y y/o Female s/p right hip evacuation hematoma/I&D/GERARDO/Head and liner exchange, POD # 1  -wound care, isometric exercises, GI motility, new medications, hip precautions,  hospital course and discharge planning reviewed with pt  -Pain control/analgesia - adequate  -Monitor H&H. Will check in am.  -Monitor drain output - HV/PEDRO drain dressing changed.   -Inc spirometry reviewed and counseled  -Venodynes/foot pumps  -PT/OT/WBAT  -Anticoagulation: ASA 325mg BID  -D/W Dr. Haynes  -DC plan: home pending labs and drain removal.

## 2020-03-12 NOTE — DISCHARGE NOTE NURSING/CASE MANAGEMENT/SOCIAL WORK - PATIENT PORTAL LINK FT
You can access the FollowMyHealth Patient Portal offered by [facility] by registering at the following website: [email or web address]. By joining [facility]’s FollowMyHealth portal, you will also be able to view your health information using other applications (apps) compatible with our system.

## 2020-03-12 NOTE — DISCHARGE NOTE NURSING/CASE MANAGEMENT/SOCIAL WORK - NSDCPNDISPN_GEN_ALL_CORE
Education provided on the pain management plan of care/Opioids not applicable/not prescribed/Side effects of pain management treatment/Safe use, storage and disposal of opioids when prescribed/Activities of daily living, including home environment that might     exacerbate pain or reduce effectiveness of the pain management plan of care as well as strategies to address these issues

## 2020-03-12 NOTE — PROGRESS NOTE ADULT - SUBJECTIVE AND OBJECTIVE BOX
WHITNEY DICKINSON is a 79y Female s/p RIGHT HIP INCISION AND DEBRIDEMENT EVACUATION OF HEMATOMA RE      denies any chest pain shortness of breath palpitation dizziness lightheadedness nausea vomiting fever or chills    T(C): 36.6 (03-12-20 @ 05:00), Max: 36.9 (03-11-20 @ 09:40)  HR: 68 (03-12-20 @ 05:00) (68 - 101)  BP: 108/52 (03-12-20 @ 05:00) (108/52 - 135/49)  RR: 16 (03-12-20 @ 05:00) (16 - 18)  SpO2: 98% (03-12-20 @ 05:00) (98% - 99%)  no jvd/bruit  s1 s2 rrr  cta  s/nt/nd  no calf tend                        7.9    14.30 )-----------( 286      ( 12 Mar 2020 07:09 )             24.6   03-12    144  |  111<H>  |  25<H>  ----------------------------<  134<H>  4.1   |  23  |  0.63    Ca    8.4<L>      12 Mar 2020 07:09        cont dvt px  pain control  bowel regimen  antiemetics  incentive spirometer

## 2020-03-13 LAB
ANION GAP SERPL CALC-SCNC: 9 MMOL/L — SIGNIFICANT CHANGE UP (ref 5–17)
BUN SERPL-MCNC: 28 MG/DL — HIGH (ref 7–23)
CALCIUM SERPL-MCNC: 8.6 MG/DL — SIGNIFICANT CHANGE UP (ref 8.5–10.1)
CHLORIDE SERPL-SCNC: 110 MMOL/L — HIGH (ref 96–108)
CO2 SERPL-SCNC: 23 MMOL/L — SIGNIFICANT CHANGE UP (ref 22–31)
CREAT SERPL-MCNC: 0.79 MG/DL — SIGNIFICANT CHANGE UP (ref 0.5–1.3)
GLUCOSE SERPL-MCNC: 105 MG/DL — HIGH (ref 70–99)
HCT VFR BLD CALC: 27.7 % — LOW (ref 34.5–45)
HGB BLD-MCNC: 8.4 G/DL — LOW (ref 11.5–15.5)
MCHC RBC-ENTMCNC: 30.3 GM/DL — LOW (ref 32–36)
MCHC RBC-ENTMCNC: 32.8 PG — SIGNIFICANT CHANGE UP (ref 27–34)
MCV RBC AUTO: 108.2 FL — HIGH (ref 80–100)
NRBC # BLD: 0 /100 WBCS — SIGNIFICANT CHANGE UP (ref 0–0)
PLATELET # BLD AUTO: 309 K/UL — SIGNIFICANT CHANGE UP (ref 150–400)
POTASSIUM SERPL-MCNC: 4 MMOL/L — SIGNIFICANT CHANGE UP (ref 3.5–5.3)
POTASSIUM SERPL-SCNC: 4 MMOL/L — SIGNIFICANT CHANGE UP (ref 3.5–5.3)
RBC # BLD: 2.56 M/UL — LOW (ref 3.8–5.2)
RBC # FLD: 13.8 % — SIGNIFICANT CHANGE UP (ref 10.3–14.5)
SODIUM SERPL-SCNC: 142 MMOL/L — SIGNIFICANT CHANGE UP (ref 135–145)
WBC # BLD: 10.61 K/UL — HIGH (ref 3.8–10.5)
WBC # FLD AUTO: 10.61 K/UL — HIGH (ref 3.8–10.5)

## 2020-03-13 RX ADMIN — CELECOXIB 200 MILLIGRAM(S): 200 CAPSULE ORAL at 12:25

## 2020-03-13 RX ADMIN — ATORVASTATIN CALCIUM 10 MILLIGRAM(S): 80 TABLET, FILM COATED ORAL at 21:25

## 2020-03-13 RX ADMIN — Medication 1 MILLIGRAM(S): at 12:25

## 2020-03-13 RX ADMIN — Medication 325 MILLIGRAM(S): at 12:25

## 2020-03-13 RX ADMIN — Medication 25 MICROGRAM(S): at 05:26

## 2020-03-13 RX ADMIN — Medication 975 MILLIGRAM(S): at 06:26

## 2020-03-13 RX ADMIN — CELECOXIB 200 MILLIGRAM(S): 200 CAPSULE ORAL at 13:25

## 2020-03-13 RX ADMIN — Medication 325 MILLIGRAM(S): at 17:43

## 2020-03-13 RX ADMIN — Medication 5 MILLIGRAM(S): at 12:25

## 2020-03-13 RX ADMIN — Medication 120 MILLIGRAM(S): at 05:26

## 2020-03-13 RX ADMIN — Medication 975 MILLIGRAM(S): at 05:26

## 2020-03-13 RX ADMIN — Medication 1 TABLET(S): at 12:25

## 2020-03-13 RX ADMIN — Medication 100 MILLIGRAM(S): at 16:29

## 2020-03-13 RX ADMIN — Medication 100 MILLIGRAM(S): at 23:42

## 2020-03-13 RX ADMIN — Medication 100 MILLIGRAM(S): at 08:13

## 2020-03-13 RX ADMIN — Medication 975 MILLIGRAM(S): at 14:47

## 2020-03-13 RX ADMIN — TIOTROPIUM BROMIDE 1 CAPSULE(S): 18 CAPSULE ORAL; RESPIRATORY (INHALATION) at 12:25

## 2020-03-13 RX ADMIN — Medication 325 MILLIGRAM(S): at 05:26

## 2020-03-13 RX ADMIN — Medication 975 MILLIGRAM(S): at 13:47

## 2020-03-13 RX ADMIN — Medication 325 MILLIGRAM(S): at 08:13

## 2020-03-13 RX ADMIN — PANTOPRAZOLE SODIUM 40 MILLIGRAM(S): 20 TABLET, DELAYED RELEASE ORAL at 07:52

## 2020-03-13 RX ADMIN — POLYETHYLENE GLYCOL 3350 17 GRAM(S): 17 POWDER, FOR SOLUTION ORAL at 12:25

## 2020-03-13 RX ADMIN — Medication 1 TABLET(S): at 05:26

## 2020-03-13 RX ADMIN — Medication 1 TABLET(S): at 17:42

## 2020-03-13 RX ADMIN — Medication 100 MILLIGRAM(S): at 00:30

## 2020-03-13 RX ADMIN — Medication 325 MILLIGRAM(S): at 16:41

## 2020-03-13 NOTE — PROGRESS NOTE ADULT - SUBJECTIVE AND OBJECTIVE BOX
Patient is seen and examined at bedside. Denies CP/SOB/Dizziness/N/V/D/HA. Pain is controlled.   HV: 5/10  PEDRO 45/205    GEN: NAD  ABD: soft, NT/ND; no rebound or guarding;  Neurologic: AAOx3; CNS grossly intact; no focal deficits  RLE: Prineo dressing C/D/I. HV/PEDRO dressing site saturated sanguinous. Drain insertion sites no active drainage. Motor intact + EHL/FHL/TA/GS. Sensation is grossly intact.  Extremities warm. Compartments soft, compressible. No calf tenderness. DP 2+.  LLE:  Motor intact + EHL/FHL/TA/GS. Sensation is grossly intact. Extremities warm. Compartments soft, compressible. No calf tenderness.. DP 2+.    03-13    142  |  110<H>  |  28<H>  ----------------------------<  105<H>  4.0   |  23  |  0.79    Ca    8.6      13 Mar 2020 06:37                          8.4    10.61 )-----------( 309      ( 13 Mar 2020 06:37 )             27.7           A/P: Patient is a 79y y/o Female s/p right hip evacuation hematoma/I&D/GERARDO/Head and liner exchange, POD # 3  -wound care, isometric exercises, GI motility, new medications, hip precautions,  hospital course and discharge planning reviewed with pt  -Pain control/analgesia - adequate  - DC'd.   -Monitor drain output -  drain dressing changed.   -Inc spirometry reviewed and counseled  -Venodynes/foot pumps  -PT/OT/WBAT  -Anticoagulation: ASA 325mg BID  -OR cx's NGTD  -F/U BM. Dulcolax PO ordered  -D/W Dr. Haynes  -DC plan: home pending labs and drain removal.

## 2020-03-13 NOTE — PROGRESS NOTE ADULT - SUBJECTIVE AND OBJECTIVE BOX
WHITNEY DICKINSON is a 79y Female s/p RIGHT HIP INCISION AND DEBRIDEMENT EVACUATION OF HEMATOMA RE  by Dr. Haynes on 3/10/20. patient has no pain this morning; patient feels comfortable.    ROS: no pulmonary, cardiovascular, gastrointestinal, urological or neurological symptoms.     PHYS: T(C): 36.7 (03-13-20 @ 04:15), Max: 36.7 (03-12-20 @ 21:44)  HR: 71 (03-13-20 @ 04:15) (59 - 76)  BP: 119/50 (03-13-20 @ 04:15) (105/43 - 149/55)  RR: 16 (03-13-20 @ 04:15) (16 - 18)  SpO2: 99% (03-13-20 @ 04:15) (96% - 100%)  vss; lungs, clear; heart, reg rhythm, no murmurs, rubs or gallops;   abd, soft, non tender, normal bowel sounds, no calf tenderness or edema                         8.4    10.61 )-----------( 309      ( 13 Mar 2020 06:37 )             27.7   03-12    144  |  111<H>  |  25<H>  ----------------------------<  134<H>  4.1   |  23  |  0.63    Ca    8.4<L>      12 Mar 2020 07:09    Assessment and Plan: status post right total hip replacement; Hypertension; hyperlipidemia; history of atrial flutter; chronic obstructive pulmonary disease; postop anemia; postop leucocytosis random elevated glucose; pain control; deep vein thrombophlebitis prophylaxis; physical therapy; bowel regimen; nutrition support; follow up labs; will follow.

## 2020-03-14 LAB
ANION GAP SERPL CALC-SCNC: 7 MMOL/L — SIGNIFICANT CHANGE UP (ref 5–17)
BUN SERPL-MCNC: 19 MG/DL — SIGNIFICANT CHANGE UP (ref 7–23)
CALCIUM SERPL-MCNC: 8.7 MG/DL — SIGNIFICANT CHANGE UP (ref 8.5–10.1)
CHLORIDE SERPL-SCNC: 112 MMOL/L — HIGH (ref 96–108)
CO2 SERPL-SCNC: 26 MMOL/L — SIGNIFICANT CHANGE UP (ref 22–31)
CREAT SERPL-MCNC: 0.79 MG/DL — SIGNIFICANT CHANGE UP (ref 0.5–1.3)
GLUCOSE SERPL-MCNC: 80 MG/DL — SIGNIFICANT CHANGE UP (ref 70–99)
HCT VFR BLD CALC: 26.6 % — LOW (ref 34.5–45)
HGB BLD-MCNC: 8.3 G/DL — LOW (ref 11.5–15.5)
MCHC RBC-ENTMCNC: 31.2 GM/DL — LOW (ref 32–36)
MCHC RBC-ENTMCNC: 33.5 PG — SIGNIFICANT CHANGE UP (ref 27–34)
MCV RBC AUTO: 107.3 FL — HIGH (ref 80–100)
NRBC # BLD: 0 /100 WBCS — SIGNIFICANT CHANGE UP (ref 0–0)
PLATELET # BLD AUTO: 286 K/UL — SIGNIFICANT CHANGE UP (ref 150–400)
POTASSIUM SERPL-MCNC: 3.7 MMOL/L — SIGNIFICANT CHANGE UP (ref 3.5–5.3)
POTASSIUM SERPL-SCNC: 3.7 MMOL/L — SIGNIFICANT CHANGE UP (ref 3.5–5.3)
RBC # BLD: 2.48 M/UL — LOW (ref 3.8–5.2)
RBC # FLD: 13.8 % — SIGNIFICANT CHANGE UP (ref 10.3–14.5)
SODIUM SERPL-SCNC: 145 MMOL/L — SIGNIFICANT CHANGE UP (ref 135–145)
WBC # BLD: 7.83 K/UL — SIGNIFICANT CHANGE UP (ref 3.8–10.5)
WBC # FLD AUTO: 7.83 K/UL — SIGNIFICANT CHANGE UP (ref 3.8–10.5)

## 2020-03-14 RX ADMIN — Medication 325 MILLIGRAM(S): at 17:25

## 2020-03-14 RX ADMIN — Medication 325 MILLIGRAM(S): at 17:24

## 2020-03-14 RX ADMIN — Medication 325 MILLIGRAM(S): at 11:33

## 2020-03-14 RX ADMIN — TIOTROPIUM BROMIDE 1 CAPSULE(S): 18 CAPSULE ORAL; RESPIRATORY (INHALATION) at 11:33

## 2020-03-14 RX ADMIN — Medication 25 MICROGRAM(S): at 05:29

## 2020-03-14 RX ADMIN — Medication 100 MILLIGRAM(S): at 23:53

## 2020-03-14 RX ADMIN — Medication 1 MILLIGRAM(S): at 11:33

## 2020-03-14 RX ADMIN — Medication 100 MILLIGRAM(S): at 16:44

## 2020-03-14 RX ADMIN — Medication 325 MILLIGRAM(S): at 05:29

## 2020-03-14 RX ADMIN — Medication 120 MILLIGRAM(S): at 05:29

## 2020-03-14 RX ADMIN — CELECOXIB 200 MILLIGRAM(S): 200 CAPSULE ORAL at 11:33

## 2020-03-14 RX ADMIN — Medication 1 TABLET(S): at 11:33

## 2020-03-14 RX ADMIN — Medication 325 MILLIGRAM(S): at 08:10

## 2020-03-14 RX ADMIN — POLYETHYLENE GLYCOL 3350 17 GRAM(S): 17 POWDER, FOR SOLUTION ORAL at 11:33

## 2020-03-14 RX ADMIN — CELECOXIB 200 MILLIGRAM(S): 200 CAPSULE ORAL at 12:30

## 2020-03-14 RX ADMIN — Medication 1 TABLET(S): at 17:24

## 2020-03-14 RX ADMIN — ATORVASTATIN CALCIUM 10 MILLIGRAM(S): 80 TABLET, FILM COATED ORAL at 22:50

## 2020-03-14 RX ADMIN — Medication 1 TABLET(S): at 05:29

## 2020-03-14 RX ADMIN — PANTOPRAZOLE SODIUM 40 MILLIGRAM(S): 20 TABLET, DELAYED RELEASE ORAL at 08:10

## 2020-03-14 RX ADMIN — Medication 100 MILLIGRAM(S): at 08:10

## 2020-03-14 NOTE — PROGRESS NOTE ADULT - SUBJECTIVE AND OBJECTIVE BOX
79yFemale s/p R Hip I&D/GERARDO/Headline exchnage POD#4. Pt seen and examined in NAD. Pain controlled. Pt denies any new complaints. Pt denies CP/SOB/N/V/D/numbness/tingling/bowel or bladder dysfunction. (+)voids (+)flatus (+)tolerating PO Diet    PE:   RLE: dressing c/d/i. (+)PEDRO drain in place, +ROM ankle/toes. Calf: soft, compressible and nontender. DP/PT 2+ NVI.                        8.3    7.83  )-----------( 286      ( 14 Mar 2020 09:05 )             26.6       03-13    142  |  110<H>  |  28<H>  ----------------------------<  105<H>  4.0   |  23  |  0.79    Ca    8.6      13 Mar 2020 06:37      A/P: 79yFemale s/p R Hip I&D/GERARDO/Headliner exchange POD#4  Monitor & Record drain output every 8 hours, will d/c drain once <20ml for 2shifts in a row.   F/U OR CxL NGTD  Pain controlled  + Total hip precautions  PT: WBAT   DVT ppx: SCDs and ASA BID  Wound care, Isometric exercises, incentive spirometry   Discharge: planning Home once drain out and Cx final  All the above discussed and understood by pt

## 2020-03-15 ENCOUNTER — TRANSCRIPTION ENCOUNTER (OUTPATIENT)
Age: 80
End: 2020-03-15

## 2020-03-15 VITALS — HEART RATE: 63 BPM | OXYGEN SATURATION: 99 % | SYSTOLIC BLOOD PRESSURE: 135 MMHG | DIASTOLIC BLOOD PRESSURE: 38 MMHG

## 2020-03-15 RX ORDER — DOCUSATE SODIUM 100 MG
2 CAPSULE ORAL
Qty: 0 | Refills: 0 | DISCHARGE

## 2020-03-15 RX ORDER — MAGNESIUM HYDROXIDE 400 MG/1
30 TABLET, CHEWABLE ORAL
Qty: 0 | Refills: 0 | DISCHARGE
Start: 2020-03-15

## 2020-03-15 RX ORDER — CELECOXIB 200 MG/1
1 CAPSULE ORAL
Qty: 0 | Refills: 0 | DISCHARGE
Start: 2020-03-15

## 2020-03-15 RX ORDER — OXYCODONE HYDROCHLORIDE 5 MG/1
1 TABLET ORAL
Qty: 0 | Refills: 0 | DISCHARGE
Start: 2020-03-15

## 2020-03-15 RX ORDER — CEPHALEXIN 500 MG
1 CAPSULE ORAL
Qty: 40 | Refills: 0
Start: 2020-03-15 | End: 2020-03-24

## 2020-03-15 RX ORDER — LACTOBACILLUS ACIDOPHILUS 100MM CELL
1 CAPSULE ORAL
Qty: 0 | Refills: 0 | DISCHARGE
Start: 2020-03-15

## 2020-03-15 RX ORDER — ASPIRIN/CALCIUM CARB/MAGNESIUM 324 MG
1 TABLET ORAL
Qty: 0 | Refills: 0 | DISCHARGE
Start: 2020-03-15

## 2020-03-15 RX ORDER — PANTOPRAZOLE SODIUM 20 MG/1
1 TABLET, DELAYED RELEASE ORAL
Qty: 0 | Refills: 0 | DISCHARGE
Start: 2020-03-15

## 2020-03-15 RX ORDER — POLYETHYLENE GLYCOL 3350 17 G/17G
17 POWDER, FOR SOLUTION ORAL
Qty: 0 | Refills: 0 | DISCHARGE
Start: 2020-03-15

## 2020-03-15 RX ORDER — CEPHALEXIN 500 MG
500 CAPSULE ORAL
Refills: 0 | Status: DISCONTINUED | OUTPATIENT
Start: 2020-03-16 | End: 2020-03-15

## 2020-03-15 RX ORDER — SENNA PLUS 8.6 MG/1
2 TABLET ORAL
Qty: 0 | Refills: 0 | DISCHARGE
Start: 2020-03-15

## 2020-03-15 RX ORDER — OXYCODONE HYDROCHLORIDE 5 MG/1
1 TABLET ORAL
Qty: 0 | Refills: 0 | DISCHARGE

## 2020-03-15 RX ADMIN — Medication 325 MILLIGRAM(S): at 08:15

## 2020-03-15 RX ADMIN — Medication 120 MILLIGRAM(S): at 06:00

## 2020-03-15 RX ADMIN — PANTOPRAZOLE SODIUM 40 MILLIGRAM(S): 20 TABLET, DELAYED RELEASE ORAL at 06:01

## 2020-03-15 RX ADMIN — Medication 325 MILLIGRAM(S): at 06:00

## 2020-03-15 RX ADMIN — Medication 25 MICROGRAM(S): at 06:01

## 2020-03-15 RX ADMIN — Medication 1 TABLET(S): at 06:00

## 2020-03-15 NOTE — DISCHARGE NOTE PROVIDER - NSDCCPTREATMENT_GEN_ALL_CORE_FT
PRINCIPAL PROCEDURE  Procedure: Incision and drainage, acetabulum, with hip irrigation and debridement  Findings and Treatment:

## 2020-03-15 NOTE — DISCHARGE NOTE PROVIDER - NSDCFUADDAPPT_GEN_ALL_CORE_FT
Follow up with your surgeon in one week. Call for appointment.  If you need more pain medication, call your surgeon's office. For medication refills or authorizations, please call [phone]  We recommend that you call and schedule a follow up appointment with your primary care physician for repeat blood work (CBC and BMP) for post hospital discharge follow-up care.  Call your surgeon if you have increased redness/pain/drainage or fever. Return to ER for shortness of breath/calf tenderness.

## 2020-03-15 NOTE — DISCHARGE NOTE PROVIDER - NSDCCPCAREPLAN_GEN_ALL_CORE_FT
PRINCIPAL DISCHARGE DIAGNOSIS  Diagnosis: Status post evacuation of hematoma  Assessment and Plan of Treatment:

## 2020-03-15 NOTE — DISCHARGE NOTE PROVIDER - HOSPITAL COURSE
79yFemale with history of right hip hematoma post right total hip arthroplasty presenting for  I&D right hip with head and liner exchange by Dr Antelmo Haynes on 3/15/2020. Risk and benefits of surgery were explained to the patient. The patient understood and agreed to proceed with surgery. Patient underwent the procedure with no intraoperative complications. Pt was brought in stable condition to the PACU. Once stable in PACU, pt was brought to the floor. During hospital stay pt was followed by Medicine. She was placed on antibioltics penidng final OR cultures during this admission. Pt had an uneventful hospital course. Pt is stable for discharge to home on POD# 5

## 2020-03-15 NOTE — DISCHARGE NOTE PROVIDER - NSDCMRMEDTOKEN_GEN_ALL_CORE_FT
aspirin 325 mg oral delayed release tablet: 1 tab(s) orally 2 times a day  Breo Ellipta 100 mcg-25 mcg/inh inhalation powder: 1 puff(s) inhaled once a day  celecoxib 200 mg oral capsule: 1 cap(s) orally once a day  cephalexin 500 mg oral capsule: 1 cap(s) orally 4 times a day MDD:4  dilTIAZem 120 mg/24 hours oral capsule, extended release: 1 cap(s) orally once a day  Dulcolax Laxative 5 mg oral delayed release tablet: 1 tab(s) orally once a day  fluticasone 50 mcg/inh nasal spray: 1 spray(s) nasal once a day  lactobacillus acidophilus oral capsule: 1 tab(s) orally 2 times a day  levothyroxine 25 mcg (0.025 mg) oral tablet: 1 tab(s) orally once a day  magnesium hydroxide 8% oral suspension: 30 milliliter(s) orally once a day, As needed, Constipation  Multiple Vitamins oral tablet: 1 tab(s) orally once a day  NP Thyroid 30 mg oral tablet: 1 tab(s) orally once a day  oxyCODONE 5 mg oral tablet: 1-2 tab(s) orally every 4 hours, As needed for pain.   pantoprazole 40 mg oral delayed release tablet: 1 tab(s) orally once a day (before a meal)  polyethylene glycol 3350 oral powder for reconstitution: 17 gram(s) orally once a day  pravastatin 10 mg oral tablet: 1 tab(s) orally once a day  senna oral tablet: 2 tab(s) orally once a day (at bedtime), As needed, Constipation  Spiriva HandiHaler 18 mcg inhalation capsule: 1 cap(s) inhaled once a day

## 2020-03-15 NOTE — DISCHARGE NOTE PROVIDER - CARE PROVIDER_API CALL
Antelmo Haynes)  Orthopaedic Surgery  [address] [place] [address]  Phone: [phone]  Fax: [phone]  Follow Up Time:

## 2020-03-15 NOTE — DISCHARGE NOTE PROVIDER - NSDCACTIVITY_GEN_ALL_CORE
Walking - Indoors allowed/Do not make important decisions/Stairs allowed/Showering allowed/No heavy lifting/straining/Walking - Outdoors allowed/Do not drive or operate machinery

## 2020-03-24 LAB
CULTURE RESULTS: SIGNIFICANT CHANGE UP
GRAM STN FLD: SIGNIFICANT CHANGE UP
SPECIMEN SOURCE: SIGNIFICANT CHANGE UP

## 2020-03-25 LAB
CULTURE RESULTS: SIGNIFICANT CHANGE UP
CULTURE RESULTS: SIGNIFICANT CHANGE UP
GRAM STN FLD: SIGNIFICANT CHANGE UP
GRAM STN FLD: SIGNIFICANT CHANGE UP
SPECIMEN SOURCE: SIGNIFICANT CHANGE UP
SPECIMEN SOURCE: SIGNIFICANT CHANGE UP

## 2020-03-27 DIAGNOSIS — M96.840 POSTPROCEDURAL HEMATOMA OF A MUSCULOSKELETAL STRUCTURE FOLLOWING A MUSCULOSKELETAL SYSTEM PROCEDURE: ICD-10-CM
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
## 2020-03-27 DIAGNOSIS — S72.141A DISPLACED INTERTROCHANTERIC FRACTURE OF RIGHT FEMUR, INITIAL ENCOUNTER FOR CLOSED FRACTURE: ICD-10-CM

## 2020-03-27 DIAGNOSIS — T84.090A OTHER MECHANICAL COMPLICATION OF INTERNAL RIGHT HIP PROSTHESIS, INITIAL ENCOUNTER: ICD-10-CM

## 2020-03-27 DIAGNOSIS — I48.91 UNSPECIFIED ATRIAL FIBRILLATION: ICD-10-CM

## 2020-03-27 DIAGNOSIS — I10 ESSENTIAL (PRIMARY) HYPERTENSION: ICD-10-CM

## 2020-03-27 DIAGNOSIS — J44.9 CHRONIC OBSTRUCTIVE PULMONARY DISEASE, UNSPECIFIED: ICD-10-CM

## 2020-03-27 DIAGNOSIS — L76.32 POSTPROCEDURAL HEMATOMA OF SKIN AND SUBCUTANEOUS TISSUE FOLLOWING OTHER PROCEDURE: ICD-10-CM

## 2020-03-27 DIAGNOSIS — E87.5 HYPERKALEMIA: ICD-10-CM

## 2020-03-27 DIAGNOSIS — E03.9 HYPOTHYROIDISM, UNSPECIFIED: ICD-10-CM
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
## 2020-12-10 PROBLEM — Z86.79 PERSONAL HISTORY OF OTHER DISEASES OF THE CIRCULATORY SYSTEM: Chronic | Status: ACTIVE | Noted: 2019-11-05

## 2020-12-10 PROBLEM — E78.5 HYPERLIPIDEMIA, UNSPECIFIED: Chronic | Status: ACTIVE | Noted: 2020-03-06

## 2020-12-13 ENCOUNTER — APPOINTMENT (OUTPATIENT)
Dept: DISASTER EMERGENCY | Facility: CLINIC | Age: 80
End: 2020-12-13

## 2020-12-13 ENCOUNTER — LABORATORY RESULT (OUTPATIENT)
Age: 80
End: 2020-12-13

## 2021-01-08 PROBLEM — Z00.00 ENCOUNTER FOR PREVENTIVE HEALTH EXAMINATION: Status: ACTIVE | Noted: 2021-01-08

## 2021-01-11 ENCOUNTER — APPOINTMENT (OUTPATIENT)
Dept: DISASTER EMERGENCY | Facility: CLINIC | Age: 81
End: 2021-01-11

## 2021-01-12 LAB — SARS-COV-2 N GENE NPH QL NAA+PROBE: NOT DETECTED

## 2021-05-31 NOTE — OCCUPATIONAL THERAPY INITIAL EVALUATION ADULT - PATIENT/FAMILY/SIGNIFICANT OTHER GOALS STATEMENT, OT EVAL
done Pt would like to be restored to prior level of function and receive rehab services at home post-operatively.

## 2021-08-19 ENCOUNTER — APPOINTMENT (OUTPATIENT)
Dept: DISASTER EMERGENCY | Facility: CLINIC | Age: 81
End: 2021-08-19

## 2021-08-20 LAB — SARS-COV-2 N GENE NPH QL NAA+PROBE: NOT DETECTED

## 2021-10-14 DIAGNOSIS — Z01.818 ENCOUNTER FOR OTHER PREPROCEDURAL EXAMINATION: ICD-10-CM

## 2021-10-15 ENCOUNTER — APPOINTMENT (OUTPATIENT)
Dept: DISASTER EMERGENCY | Facility: CLINIC | Age: 81
End: 2021-10-15

## 2021-10-16 LAB — SARS-COV-2 N GENE NPH QL NAA+PROBE: NOT DETECTED

## 2021-10-29 NOTE — OCCUPATIONAL THERAPY INITIAL EVALUATION ADULT - VISUAL ASSESSMENT: DEPTH PERCEPTION
decreased ability to use arms for pushing/pulling/decreased ability to use legs for bridging/pushing WFL

## 2022-01-01 NOTE — PROGRESS NOTE ADULT - SUBJECTIVE AND OBJECTIVE BOX
Patient is seen and examined at bedside. Denies CP/SOB/Dizziness/N/V/D/HA. Pain is controlled.    15/40    Vital Signs Last 24 Hrs  T(C): 36.7 (15 Mar 2020 05:58), Max: 36.8 (14 Mar 2020 19:41)  T(F): 98 (15 Mar 2020 05:58), Max: 98.3 (14 Mar 2020 19:41)  HR: 71 (15 Mar 2020 05:58) (64 - 78)  BP: 127/62 (15 Mar 2020 05:58) (126/45 - 144/45)  BP(mean): --  RR: 16 (15 Mar 2020 05:58) (16 - 16)  SpO2: 98% (15 Mar 2020 05:58) (98% - 100%)    GEN: NAD  ABD: soft, NT/ND; no rebound or guarding;  Neurologic: AAOx3; CNS grossly intact; no focal deficits  RLE: Hip: Prineo dressing C/D/I. PEDRO with serous drainage. Motor intact + EHL/FHL/TA/GS. Sensation is grossly intact.  Extremities warm. . Compartments soft, compressible. No calf tenderness. DP 2+.  LLE:  Motor intact + EHL/FHL/TA/GS. Sensation is grossly intact. Extremities warm. Compartments soft, compressible. No calf tenderness.. DP 2+.    Labs:                          8.3    7.83  )-----------( 286      ( 14 Mar 2020 09:05 )             26.6       03-14    145  |  112<H>  |  19  ----------------------------<  80  3.7   |  26  |  0.79    Ca    8.7      14 Mar 2020 09:05        A/P: Patient is a 79y y/o Female s/p right hip I&D/Head/liner exchange POD#5,   -wound care, isometric exercises, GI motility, new medications, hip precautions,  hospital course and discharge planning reviewed with pt  -Pedro drain DC'd. Dry clean dressing applied to PEDRO drain site.   -Pain control/analgesia adequate  -Inc spirometry reviewed and counseled  -Venodynes/foot pumps  -PT/OT/WBAT  -OR cx's NGTD - will keep for 14 days.   -Anticoagulation: ASA 325mg BID  -Per Dr. Haynes: PO keflex 500mg QID X 10 days.   -DC plan: Home today. Pt has all medications at home and does not need Rx's except for Keflex. (1) Oriented to own ability

## 2022-06-10 ENCOUNTER — APPOINTMENT (OUTPATIENT)
Dept: PAIN MANAGEMENT | Facility: CLINIC | Age: 82
End: 2022-06-10

## 2022-08-10 NOTE — OCCUPATIONAL THERAPY INITIAL EVALUATION ADULT - STANDING BALANCE: STATIC
Patient requests all Lab, Cardiology, and Radiology Results on their Discharge Instructions
fair plus

## 2022-08-11 NOTE — PATIENT PROFILE ADULT - NSPROPOAPRESSUREINJURY_GEN_A_NUR
(8/12/22): TSH: 3.08 ; FT4: 2.08  On Synthroid --> dose decreased recently currently on 100 mcg previously on 112 mcg  Note: pt has an elevated FT4 over the past couple years  Note: needs repeat TFT's in about 6 weeks   no

## 2022-08-26 ENCOUNTER — APPOINTMENT (OUTPATIENT)
Dept: PAIN MANAGEMENT | Facility: CLINIC | Age: 82
End: 2022-08-26

## 2022-08-26 VITALS — WEIGHT: 160 LBS | BODY MASS INDEX: 25.11 KG/M2 | HEIGHT: 67 IN

## 2022-08-26 DIAGNOSIS — Z78.9 OTHER SPECIFIED HEALTH STATUS: ICD-10-CM

## 2022-08-26 PROCEDURE — 20553 NJX 1/MLT TRIGGER POINTS 3/>: CPT

## 2022-08-26 PROCEDURE — 99214 OFFICE O/P EST MOD 30 MIN: CPT | Mod: 25

## 2022-08-26 PROCEDURE — J3490M: CUSTOM

## 2022-08-26 NOTE — HISTORY OF PRESENT ILLNESS
[Lower back] : lower back [9] : 9 [5] : 5 [Radiating] : radiating [Stabbing] : stabbing [Constant] : constant [Sleep] : sleep [Rest] : rest [Injection therapy] : injection therapy [Sitting] : sitting [Standing] : standing [Walking] : walking [Bending forward] : bending forward [Lying in bed] : lying in bed [Retired] : Work status: retired [de-identified] : pt states she is having pain in the lower back , the right food gets numb and rest of the leg hurts  [] : no [FreeTextEntry7] : right side

## 2022-08-26 NOTE — PHYSICAL EXAM
[de-identified] : PHYSICAL EXAM\par \par Constitutional: \par Appears well, no apparent distress\par Ability to communicate: Normal\par Respiratory: non-labored breathing\par Skin: no rash noted\par Head: normocephalic, atraumatic\par Neck: no visible thyroid enlargement\par Eyes: extraocular movements intact\par Neurologic: alert and oriented x3\par Psychiatric: normal mood, affect, and behavior\par \par Lumbar Spine: \par Palpation: right lumbar paraspinal spasm and right lumbar paraspinal tenderness to palpation.\par ROM: Diminished range of motion in all plains.  Patient notes pain with lateral bending to the right.\par MMT: Motor exam is 5/5 through out bilateral lower extremities.\par Sensation: Light touch and pain is intact throughout bilateral lower extremities.\par Reflexes: achilles and patella reflexes are intact and  symmetrical.  No sustained clonus.\par Special Testing: Positive straight leg raise on the right side.\par \par Assessemnt:\par Radiculopathy of lumbosacral region (M54.17)\par Myalgia (M79.10)\par \par Plan:\par After discussing various treatment options with the patient including but not limited to oral medications, physical therapy, exercise modalities as well as interventional spinal injections, we have decided with the following plan:\par The patient is presenting with acute/sub-acute radicular pain with impairment in ADLs and functionality.  The pain has not responded to conservative care including NSAID therapy and/or physical therapy.  There is no bleeding tendency, unstable medical condition, or systemic infection\par \par The risks, benefits and alternatives of the proposed procedure were explained in detail with the patient.  The risks outlined include but are not limited to infection, bleeding, post dural puncture headache, nerve injury, a temporary increase in pain, failure to resolve symptoms, allergic reaction, symptom recurrence, and possible elevation of blood sugar.  All questions were answered to patient's satisfaction and he/she verbalized an understanding.\par \par Follow up 1-2 weeks post injection foe re-evaluation.\par \par Continue home exercises, stretching, activity modification, physical therapy, and conservative care.\par \par \par

## 2022-09-12 LAB — SARS-COV-2 N GENE NPH QL NAA+PROBE: NOT DETECTED

## 2022-09-13 ENCOUNTER — APPOINTMENT (OUTPATIENT)
Age: 82
End: 2022-09-13

## 2022-09-13 PROCEDURE — 64484 NJX AA&/STRD TFRM EPI L/S EA: CPT | Mod: RT

## 2022-09-13 PROCEDURE — 64483 NJX AA&/STRD TFRM EPI L/S 1: CPT | Mod: RT

## 2022-09-30 ENCOUNTER — APPOINTMENT (OUTPATIENT)
Dept: PAIN MANAGEMENT | Facility: CLINIC | Age: 82
End: 2022-09-30

## 2022-09-30 VITALS — HEIGHT: 67 IN | BODY MASS INDEX: 25.11 KG/M2 | WEIGHT: 160 LBS

## 2022-09-30 PROCEDURE — 99213 OFFICE O/P EST LOW 20 MIN: CPT

## 2022-09-30 NOTE — ASSESSMENT
[FreeTextEntry1] : PHYSICAL EXAM\par \par Constitutional: \par Appears well, no apparent distress\par Ability to communicate: Normal\par Respiratory: non-labored breathing\par Skin: no rash noted\par Head: normocephalic, atraumatic\par Neck: no visible thyroid enlargement\par Eyes: extraocular movements intact\par Neurologic: alert and oriented x3\par Psychiatric: normal mood, affect, and behavior\par \par \par Back, including spine: Range of motion of the thoracic and lumbar spine is as follows: Diminished range of motion in all planes.  MMT 5/5 BL LE.  Sensation is intact to light touch and pin prick BL LE.  Negative Straight leg raise testing bilaterally.  Negatvie Kemps maneuver bilaterally.  Normal Gait.\par \par \par Assessment:\par Lumbago\par \par Plan:\par After discussing various treatment options with the patient including but not limited to oral medications, physical therapy, exercise modalities as well as interventional spinal injections, we have decided with the following plan:\par  \par Continue home exercises, stretching, activity modification, physical therapy, and conservative care.\par \par \par

## 2022-09-30 NOTE — HISTORY OF PRESENT ILLNESS
[Lower back] : lower back [0] : 0 [Sleep] : sleep [Retired] : Work status: retired [de-identified] : pt states she is feeling better after injection  [] : no

## 2022-12-19 NOTE — OCCUPATIONAL THERAPY INITIAL EVALUATION ADULT - PROPRIOCEPTION, LUE, OT EVAL
Pt called with weak stream and nocturia x 10, pt had TURP 11/16  I have scheduled pt with Dr. Terrazas tomorrow.   ----- Message from Bonnie Rodríguez sent at 12/19/2022  8:41 AM CST -----  Contact: pt    Who Called:delfino   Who Left Message for Patient:  Does the patient know what this is regarding?:questions about treatment   Would the patient rather a call back or a response via [facility]?   Best Call Back Number:.[phone]    Additional Information:         
within normal limits

## 2023-01-01 NOTE — OCCUPATIONAL THERAPY INITIAL EVALUATION ADULT - PERTINENT HX OF CURRENT PROBLEM, REHAB EVAL
Reviewed discharge instructions with mom. Notified mom to return to [facility] for juandice check on Monday, Oct 23rd at 10:30 am. Notified mom to schedule pediatrician appt with Dr. Waite on Monday, Oct 23rd. Mom verbalized understanding. Infant pink, no distress noted. Footprint sheet signed by mom, ID band verified.    Pt was admitted for elective surgery for ORIF of right hip. Pt had a right total hip arthroplasty 4 weeks prior.  About 2 weeks ago, pt was walking when she heard a noise in the right hip and had some pain in the right hip with ambulating.  Pt followed up with Dr. Haynes in the office and started 10% weight bearing to the right lower extremity.

## 2023-02-08 ENCOUNTER — APPOINTMENT (OUTPATIENT)
Dept: PAIN MANAGEMENT | Facility: CLINIC | Age: 83
End: 2023-02-08
Payer: MEDICARE

## 2023-02-08 VITALS — HEIGHT: 67 IN | BODY MASS INDEX: 25.11 KG/M2 | WEIGHT: 160 LBS

## 2023-02-08 PROCEDURE — 96372 THER/PROPH/DIAG INJ SC/IM: CPT | Mod: NC

## 2023-02-08 PROCEDURE — 99214 OFFICE O/P EST MOD 30 MIN: CPT | Mod: 25

## 2023-02-08 PROCEDURE — J3490M: CUSTOM | Mod: NC

## 2023-02-08 PROCEDURE — 20552 NJX 1/MLT TRIGGER POINT 1/2: CPT

## 2023-02-08 NOTE — HISTORY OF PRESENT ILLNESS
[Neck] : neck [Lower back] : lower back [10] : 10 [Radiating] : radiating [Shooting] : shooting [Tingling] : tingling [Constant] : constant [Sleep] : sleep [Sitting] : sitting [Lying in bed] : lying in bed [de-identified] : pt is following up for lower back pain the pain goes into the right leg and she states she is having pain in the neck the pain goes into the right shoulder and into the arm  [] : Patient is currently injured and not playing sports: no [FreeTextEntry6] : numbness [FreeTextEntry7] : into the right shoulder and into the right leg  [FreeTextEntry9] : placing a pillow under  [de-identified] : if arm hangs

## 2023-02-08 NOTE — PHYSICAL EXAM
[Flexion] : flexion [Extension] : extension [Rotation to left] : rotation to left [Rotation to right] : rotation to right [] : sensory exam non-focal throughout both lower extremities

## 2023-02-08 NOTE — PROCEDURE
[Trigger point 1-2 muscle groups] : trigger point 1-2 muscle groups [Right] : of the right [Trapezius muscle] : trapezius muscle [Pain] : pain [Inflammation] : inflammation [Alcohol] : alcohol [Ethyl Chloride sprayed topically] : ethyl chloride sprayed topically [Sterile technique used] : sterile technique used [___ cc    1%] : Lidocaine ~Vcc of 1%  [___ cc    0.25%] : Bupivacaine (Marcaine) ~Vcc of 0.25%  [___ cc    10mg] : Triamcinolone (Kenalog) ~Vcc of 10 mg  [___ cc    30mg] : Ketorolac (Toradol) ~Vcc of 30 mg  [] : Patient tolerated procedure well [Call if redness, pain or fever occur] : call if redness, pain or fever occur [Risks, benefits, alternatives discussed / Verbal consent obtained] : the risks benefits, and alternatives have been discussed, and verbal consent was obtained

## 2023-02-08 NOTE — ASSESSMENT
[FreeTextEntry1] : 100% relief post R L3-4 L4-5 TFESI with improved ROM and ADLS- 9/13/22\par \par pain has returned, will repeat TFESI \par \par \par she had TPI in R trap 8/22 with relief, however started to develop pain again within last 2 weeks.  Pain is into RUE with tingling 4-5th finger\par

## 2023-02-23 ENCOUNTER — LABORATORY RESULT (OUTPATIENT)
Age: 83
End: 2023-02-23

## 2023-02-28 ENCOUNTER — APPOINTMENT (OUTPATIENT)
Age: 83
End: 2023-02-28
Payer: MEDICARE

## 2023-02-28 PROCEDURE — 64484 NJX AA&/STRD TFRM EPI L/S EA: CPT | Mod: RT

## 2023-02-28 PROCEDURE — 64483 NJX AA&/STRD TFRM EPI L/S 1: CPT | Mod: RT

## 2023-03-13 ENCOUNTER — APPOINTMENT (OUTPATIENT)
Dept: PAIN MANAGEMENT | Facility: CLINIC | Age: 83
End: 2023-03-13

## 2023-03-15 ENCOUNTER — APPOINTMENT (OUTPATIENT)
Dept: PAIN MANAGEMENT | Facility: CLINIC | Age: 83
End: 2023-03-15
Payer: MEDICARE

## 2023-03-15 VITALS — WEIGHT: 160 LBS | BODY MASS INDEX: 25.11 KG/M2 | HEIGHT: 67 IN

## 2023-03-15 PROCEDURE — 99213 OFFICE O/P EST LOW 20 MIN: CPT

## 2023-03-15 NOTE — ASSESSMENT
[FreeTextEntry1] : R TFESI WITH >95% RELIEF OF PAIN IMPROVED ROM AND ADLS\par \par NECK PAIN IMPROVED WITH TPI- SOME RESIDUAL PARESTHESIAS IN R LATERAL HAND AND 4-5TH FINGER\par \par MRI C SPINE reviewed

## 2023-03-15 NOTE — HISTORY OF PRESENT ILLNESS
[Lower back] : lower back [Dull/Aching] : dull/aching [Intermittent] : intermittent [Injection therapy] : injection therapy [Standing] : standing [Walking] : walking [Bending forward] : bending forward [FreeTextEntry1] : 3/15/23: pt is following up after right sided L3-4,L4-5 TFESI,  [] : no

## 2023-04-12 ENCOUNTER — OFFICE (OUTPATIENT)
Dept: URBAN - METROPOLITAN AREA CLINIC 35 | Facility: CLINIC | Age: 83
Setting detail: OPHTHALMOLOGY
End: 2023-04-12
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
## 2023-04-12 PROCEDURE — NO SHOW FE NO SHOW FEE: Performed by: OPHTHALMOLOGY

## 2023-05-19 NOTE — PATIENT PROFILE ADULT - FALL HARM RISK TYPE OF ASSESSMENT
Recommendations: -  Obtained liver biopsy slides for Pathology review and Path conf.  - submitted to pathology - conf note made. Will review next week.   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  -  Labs every 3 months next in 3 months before next visit:  CBC, CMP, PT INR, PETH -  Low salt in the diet, avoid canned, bottled and processed foods. -  Continue current meds -  Ultrasound of abdomen and AFP every 6 months, next due in May 2023.  -  No alcohol, smoking, sedatives and meds with codeine. -  Avoid high intake of Tylenol (more than 4 extra-strength (500 mg) pills in one day) -  Call us if any bleeding, fevers, confusion, disorientation occur -  Endoscopy: per GI MD locally -  Transplant option discussed, will evaluate when complication develops. -  Return in 3 months.  
admission

## 2023-06-02 ENCOUNTER — APPOINTMENT (OUTPATIENT)
Dept: PAIN MANAGEMENT | Facility: CLINIC | Age: 83
End: 2023-06-02
Payer: MEDICARE

## 2023-06-02 VITALS — BODY MASS INDEX: 25.11 KG/M2 | HEIGHT: 67 IN | WEIGHT: 160 LBS

## 2023-06-02 PROCEDURE — 99214 OFFICE O/P EST MOD 30 MIN: CPT

## 2023-06-02 NOTE — HISTORY OF PRESENT ILLNESS
[Lower back] : lower back [Dull/Aching] : dull/aching [Intermittent] : intermittent [Injection therapy] : injection therapy [Standing] : standing [Walking] : walking [Bending forward] : bending forward [8] : 8 [Radiating] : radiating [FreeTextEntry1] : pt is following up for lower back pain , she states monday she fell and slipped in the bathroom and the pain is going into the right leg and also feels it above the hip  [] : no

## 2023-06-02 NOTE — PHYSICAL EXAM
[de-identified] : PHYSICAL EXAM\par \par Constitutional: \par Appears well, no apparent distress\par Ability to communicate: Normal\par Respiratory: non-labored breathing\par Skin: no rash noted\par Head: normocephalic, atraumatic\par Neck: no visible thyroid enlargement\par Eyes: extraocular movements intact\par Neurologic: alert and oriented x3\par Psychiatric: normal mood, affect, and behavior\par \par Lumbar Spine: \par Palpation: right lumbar paraspinal spasm and right lumbar paraspinal tenderness to palpation.\par ROM: Diminished range of motion in all plains.  Patient notes pain with lateral bending to the right.\par MMT: Motor exam is 5/5 through out bilateral lower extremities.\par Sensation: Light touch and pain is intact throughout bilateral lower extremities.\par Reflexes: achilles and patella reflexes are intact and  symmetrical.  No sustained clonus.\par Special Testing: Positive straight leg raise on the right side.\par \par Assessemnt:\par Radiculopathy of lumbosacral region (M54.17)\par Myalgia (M79.10)\par \par Plan:\par After discussing various treatment options with the patient including but not limited to oral medications, physical therapy, exercise modalities as well as interventional spinal injections, we have decided with the following plan:\par The patient is presenting with acute/sub-acute radicular pain with impairment in ADLs and functionality.  The pain has not responded to conservative care including NSAID therapy and/or physical therapy.  There is no bleeding tendency, unstable medical condition, or systemic infection\par \par The risks, benefits and alternatives of the proposed procedure were explained in detail with the patient.  The risks outlined include but are not limited to infection, bleeding, post dural puncture headache, nerve injury, a temporary increase in pain, failure to resolve symptoms, allergic reaction, symptom recurrence, and possible elevation of blood sugar.  All questions were answered to patient's satisfaction and he/she verbalized an understanding.\par \par Follow up 1-2 weeks post injection foe re-evaluation.\par \par Continue home exercises, stretching, activity modification, physical therapy, and conservative care.\par \par \par

## 2023-06-02 NOTE — ASSESSMENT
[FreeTextEntry1] : R TFESI WITH >95% RELIEF OF PAIN IMPROVED ROM AND ADLS 3/23 WITH SUSTAINED RELIEF FOR SEVERAL MONTHS\par \par s/p  epidural steroid injection with improvement in sitting/standing tolerance, improvement in ambulation, and improvement in sleep hygiene.   Patients notes pain relief of 95  %.\par \par PAIN IN LOWER BACK AND INTO RLE

## 2023-06-02 NOTE — DISCUSSION/SUMMARY
[Surgical risks reviewed] : Surgical risks reviewed [de-identified] : PROCEED R L3-4 4-5 TFESI\par \par \par After discussing various treatment options with the patient including but not limited to oral medications, physical therapy, exercise, modalities as well as interventional spinal injections, we have decided with the following plan:\par I personally reviewed the MRI/CT scan images and agree with the radiologist's report. The radiological findings were discussed with the patient.\par The risks, benefits, contents and alternatives to injection were explained in full to the patient. Risks outlined include but are not limited to infection,sepsis, bleeding, post-dural puncture headache, nerve damage, temporary increase in pain, syncopal episode, failure to resolve symptoms, allergic reaction, symptom recurrence, and elevation of blood sugar in diabetics. Cortisone may cause immunosuppression. Patient understands the risks. All questions were answered. After discussion of options, patient requested an injection. Information regarding the injection was given to the patient. Which medications to stop prior to the injection was explained to the patient as well.\par Follow up in 1-2 weeks post injection for re-evaluation.\par Continue Home exercises, stretching, activity modification, physical therapy, and conservative care.\par Patient is presenting with acute/sub-acute radicular pain with impairment in ADLs and functionality. The pain has not responded to conservative care including nsaid therapy and/or physical therapy. There is no bleeding tendency, unstable medical condition, or systemic infection.\par

## 2023-06-08 ENCOUNTER — APPOINTMENT (OUTPATIENT)
Dept: PAIN MANAGEMENT | Facility: CLINIC | Age: 83
End: 2023-06-08

## 2023-06-13 ENCOUNTER — APPOINTMENT (OUTPATIENT)
Age: 83
End: 2023-06-13
Payer: MEDICARE

## 2023-06-13 PROCEDURE — 64483 NJX AA&/STRD TFRM EPI L/S 1: CPT | Mod: RT

## 2023-06-13 PROCEDURE — 64484 NJX AA&/STRD TFRM EPI L/S EA: CPT | Mod: RT

## 2023-06-26 ENCOUNTER — APPOINTMENT (OUTPATIENT)
Dept: PAIN MANAGEMENT | Facility: CLINIC | Age: 83
End: 2023-06-26

## 2023-07-14 ENCOUNTER — APPOINTMENT (OUTPATIENT)
Dept: PAIN MANAGEMENT | Facility: CLINIC | Age: 83
End: 2023-07-14
Payer: MEDICARE

## 2023-07-14 VITALS — WEIGHT: 160 LBS | BODY MASS INDEX: 25.11 KG/M2 | HEIGHT: 67 IN

## 2023-07-14 PROCEDURE — 99213 OFFICE O/P EST LOW 20 MIN: CPT

## 2023-07-14 NOTE — ASSESSMENT
[FreeTextEntry1] : 100% relief of pain with TFESI improved rom and adls\par some intermittent numbness in R LEG\par \par

## 2023-07-14 NOTE — PHYSICAL EXAM
[Orientated] : orientated [Able to Communicate] : able to communicate [Well Developed] : well developed [Well Nourished] : well nourished [] : full ROM with mild stiffness

## 2023-07-14 NOTE — HISTORY OF PRESENT ILLNESS
[Lower back] : lower back [2] : 2 [Dull/Aching] : dull/aching [Radiating] : radiating [Tingling] : tingling [Intermittent] : intermittent [Injection therapy] : injection therapy [Standing] : standing [Walking] : walking [Bending forward] : bending forward [] : no [FreeTextEntry6] : NUMBNESS [FreeTextEntry7] : toes an foot ( right side )

## 2023-10-17 ENCOUNTER — OFFICE (OUTPATIENT)
Dept: URBAN - METROPOLITAN AREA CLINIC 35 | Facility: CLINIC | Age: 83
Setting detail: OPHTHALMOLOGY
End: 2023-10-17
Payer: MEDICARE

## 2023-10-17 DIAGNOSIS — H18.413: ICD-10-CM

## 2023-10-17 DIAGNOSIS — H52.7: ICD-10-CM

## 2023-10-17 DIAGNOSIS — H02.34: ICD-10-CM

## 2023-10-17 DIAGNOSIS — H02.31: ICD-10-CM

## 2023-10-17 DIAGNOSIS — H11.153: ICD-10-CM

## 2023-10-17 DIAGNOSIS — H52.4: ICD-10-CM

## 2023-10-17 PROCEDURE — 92014 COMPRE OPH EXAM EST PT 1/>: CPT | Performed by: OPHTHALMOLOGY

## 2023-10-17 PROCEDURE — 92015 DETERMINE REFRACTIVE STATE: CPT | Performed by: OPHTHALMOLOGY

## 2023-10-17 ASSESSMENT — LID POSITION - COMMENTS
OD_COMMENTS: PUNCTAL
OS_COMMENTS: PUNCTAL

## 2023-10-17 ASSESSMENT — REFRACTION_AUTOREFRACTION
OD_AXIS: 037
OD_CYLINDER: +1.00
OS_SPHERE: +0.50
OD_SPHERE: +0.75
OS_CYLINDER: +0.75
OS_AXIS: 162

## 2023-10-17 ASSESSMENT — SPHEQUIV_DERIVED
OS_SPHEQUIV: 1.375
OS_SPHEQUIV: 1.125
OD_SPHEQUIV: 1.25
OS_SPHEQUIV: 0.875
OD_SPHEQUIV: 1.5
OS_SPHEQUIV: 0.875

## 2023-10-17 ASSESSMENT — VISUAL ACUITY
OS_BCVA: 20/25-1
OD_BCVA: 20/50-2

## 2023-10-17 ASSESSMENT — REFRACTION_MANIFEST
OS_ADD: +3.25
OD_SPHERE: +1.25
OS_CYLINDER: +1.25
OD_CYLINDER: +1.00
OD_CYLINDER: +0.25
OD_AXIS: 005
OD_SPHERE: +
OS_AXIS: 125
OD_VA1: 20/20
OS_AXIS: 115
OD_VA1: 20/25
OD_SPHERE: PLANO
OS_AXIS: 120
OD_ADD: +3.25
OS_CYLINDER: +0.75
OS_ADD: +3.50
OD_VA1: 20/20
OS_VA1: 20/60
OD_ADD: +3.50
OS_SPHERE: +1.00
OS_SPHERE: +0.50
OD_ADD: +3.50
OD_AXIS: 030
OS_CYLINDER: +1.25
OS_SPHERE: +0.25
OS_VA1: 20/40
OS_VA1: 20/60-
OS_ADD: +3.50
OD_AXIS: 030
OD_CYLINDER: +0.50

## 2023-10-17 ASSESSMENT — REFRACTION_CURRENTRX
OS_ADD: +3.25
OS_SPHERE: +0.25
OS_CYLINDER: +1.25
OS_OVR_VA: 20/
OS_AXIS: 120
OD_ADD: +3.25
OD_CYLINDER: +0.25
OS_VPRISM_DIRECTION: PROGS
OD_AXIS: 017
OD_VPRISM_DIRECTION: PROGS
OD_OVR_VA: 20/
OD_SPHERE: +1.25

## 2023-10-17 ASSESSMENT — KERATOMETRY
OS_K2POWER_DIOPTERS: 43.50
OD_K1POWER_DIOPTERS: 42.50
OD_K2POWER_DIOPTERS: 43.25
OD_AXISANGLE_DEGREES: 011
OS_AXISANGLE_DEGREES: 135
METHOD_AUTO_MANUAL: AUTO
OS_K1POWER_DIOPTERS: 43.00

## 2023-10-17 ASSESSMENT — CONFRONTATIONAL VISUAL FIELD TEST (CVF)
OD_FINDINGS: FULL
OS_FINDINGS: FULL

## 2023-10-17 ASSESSMENT — AXIALLENGTH_DERIVED
OD_AL: 23.3372
OS_AL: 23.1562
OS_AL: 23.3454
OS_AL: 23.2504
OS_AL: 23.3454
OD_AL: 23.2423

## 2023-10-17 ASSESSMENT — TONOMETRY
OD_IOP_MMHG: 12
OS_IOP_MMHG: 12

## 2023-10-17 ASSESSMENT — LID POSITION - ECTROPION
OS_ECTROPION: LLL T 1+
OD_ECTROPION: RLL T 1+

## 2023-10-17 ASSESSMENT — LID EXAM ASSESSMENTS
OS_COMMENTS: BLEPHAROCHALASIS
OD_COMMENTS: BLEPHAROCHALASIS

## 2023-10-24 ENCOUNTER — APPOINTMENT (OUTPATIENT)
Dept: PAIN MANAGEMENT | Facility: CLINIC | Age: 83
End: 2023-10-24
Payer: MEDICARE

## 2023-10-24 VITALS — HEIGHT: 67 IN | BODY MASS INDEX: 25.11 KG/M2 | WEIGHT: 160 LBS

## 2023-10-24 DIAGNOSIS — M54.12 RADICULOPATHY, CERVICAL REGION: ICD-10-CM

## 2023-10-24 PROCEDURE — 99214 OFFICE O/P EST MOD 30 MIN: CPT | Mod: 25

## 2023-10-24 PROCEDURE — 20552 NJX 1/MLT TRIGGER POINT 1/2: CPT

## 2023-10-24 PROCEDURE — J3490M: CUSTOM | Mod: NC

## 2023-11-21 ENCOUNTER — APPOINTMENT (OUTPATIENT)
Age: 83
End: 2023-11-21

## 2023-11-25 NOTE — ASU PREOP CHECKLIST - NEEDLE GAUGE
History     Chief Complaint:  Otalgia       HPI   Abdoulaye Peguero is a 87 year old female with a complaint of right ear pain, head congestion, cough.  Patient has had intermittent right ear pain for over a year.  Patient reports that it is worse with her cough and head congestion.  She denies any fevers, vomiting.      Independent Historian:   None - Patient Only    Review of External Notes:         Medications:    acetaminophen (TYLENOL) 500 MG tablet  acetaminophen (TYLENOL) 500 MG tablet  atorvastatin (LIPITOR) 40 MG tablet  carbamide peroxide (DEBROX) 6.5 % otic solution  Cholecalciferol (VITAMIN D) 50 MCG (2000 UT) CAPS  dapagliflozin (FARXIGA) 10 MG TABS tablet  erythromycin (ROMYCIN) 5 MG/GM ophthalmic ointment  estradiol (ESTRACE) 0.1 MG/GM vaginal cream  hydrOXYzine (ATARAX) 10 MG tablet  losartan (COZAAR) 25 MG tablet  salicylic acid (P&S) 2 % external shampoo        Past Medical History:    Past Medical History:   Diagnosis Date    Gastroesophageal reflux disease 02/19/2018    Hyperlipidemia 04/04/2018    LVH (left ventricular hypertrophy) 08/29/2023    Osteoarthritis of spine with radiculopathy, thoracolumbar region 07/05/2019    Prediabetes     Urinary incontinence 03/28/2018    Vitamin D deficiency 02/20/2018       Past Surgical History:    No past surgical history on file.     Physical Exam   Patient Vitals for the past 24 hrs:   BP Temp Temp src Pulse Resp SpO2   11/25/23 1401 (!) 156/85 -- -- 77 -- 97 %   11/25/23 1308 (!) 154/81 -- -- 77 -- 98 %   11/25/23 1021 (!) 155/87 97.7  F (36.5  C) Temporal 82 18 98 %        Physical Exam  General: Well-nourished, non toxic in appearance.  Eyes: PERRL, conjunctivae pink no scleral icterus or conjunctival injection  ENT:  Moist mucus membranes.  No tonsillar exudates or erythema.  Uvula is midline.  Ears: Tympanic membranes are intact.  No redness or swelling.  No tenderness to palpation of the mastoid.  No tenderness with tugging of the pinna.  Respiratory:   Lungs clear to auscultation bilaterally, no crackles/rubs/wheezes.  Good air movement  CV: Normal rate and rhythm, no murmurs  GI:  Abdomen soft and non-distended.  No tenderness, guarding or rebound  Skin: Warm, dry.  No rashes or petechiae  Musculoskeletal: No peripheral edema or calf tenderness  Neuro: Alert and oriented to person/place/time  Psychiatric: Normal affect      Emergency Department Course       Imaging:  Chest XR,  PA & LAT   Final Result   IMPRESSION: Negative chest.             Laboratory:  Labs Ordered and Resulted from Time of ED Arrival to Time of ED Departure   INFLUENZA A/B, RSV, & SARS-COV2 PCR - Normal       Result Value    Influenza A PCR Negative      Influenza B PCR Negative      RSV PCR Negative      SARS CoV2 PCR Negative          Procedures       Emergency Department Course & Assessments:             Interventions:  Medications - No data to display     Assessments:  On reevaluation, the patient is well-appearing.  She is not in any acute distress.    Independent Interpretation (X-rays, CTs, rhythm strip):  Chest x-ray does not show any signs of consolidation.    Consultations/Discussion of Management or Tests:  I did speak with the patient's daughter, who is her primary care provider.  I spoke with her over the phone.  I did advise for the patient to use Mucinex, Tylenol, I would also use Debrox for her ears as she does have a lot of wax in them.  She should follow-up with her primary care physician.       Social Determinants of Health affecting care:   None    Disposition:  The patient was discharged to home.     Impression & Plan    CMS Diagnoses: None      Medical Decision Makin-year-old female with a history of osteoarthritis and hyperlipidemia presents to the emergency department with a complaint of right ear pain.  Patient reports that she has had intermittent ear pain for the past year.  Over the past couple of days she has had increased cough and head congestion and now  her ear is hurting more than usual.  Patient denies any fevers.  She does note a sore throat.  On exam patient is not in any acute distress.  Her tympanic membranes are intact and there is no redness or bulging.  Her throat does appear little bit erythematous, but no exudates or cervical lymphadenopathy.  I do not think that she has a deep space infection in her neck.  It does not appear that she has otitis externa or media.  I would imagine that her ear is hurting more because of her head congestion.  I did advise her to use Debrox to get the wax out of her ears.  She can use Mucinex for the mucus that she is coughing up.  Would also use Zyrtec over-the-counter for her head congestion.  She needs a follow-up with her primary care provider, and I did advise her to call them on Monday to schedule a follow-up.  Her COVID, flu, RSV is negative.  Her chest x-ray does not show any signs of pneumonia.  Reevaluation patient is well-appearing in the room.  Patient is discharged home.      Diagnosis:    ICD-10-CM    1. Subacute cough  R05.2       2. Right ear pain  H92.01       3. Viral upper respiratory infection  J06.9            Discharge Medications:  Discharge Medication List as of 11/25/2023  1:56 PM             Arlette Ivey MD  11/25/2023   Arlette Ivey MD Richardson, Elizabeth, MD  11/25/23 5053     20

## 2023-12-13 ENCOUNTER — APPOINTMENT (OUTPATIENT)
Dept: PAIN MANAGEMENT | Facility: CLINIC | Age: 83
End: 2023-12-13

## 2024-01-17 ENCOUNTER — APPOINTMENT (OUTPATIENT)
Dept: PAIN MANAGEMENT | Facility: CLINIC | Age: 84
End: 2024-01-17
Payer: MEDICARE

## 2024-01-17 VITALS — BODY MASS INDEX: 25.11 KG/M2 | HEIGHT: 67 IN | WEIGHT: 160 LBS

## 2024-01-17 DIAGNOSIS — M79.18 MYALGIA, OTHER SITE: ICD-10-CM

## 2024-01-17 DIAGNOSIS — M54.2 CERVICALGIA: ICD-10-CM

## 2024-01-17 DIAGNOSIS — M54.17 RADICULOPATHY, LUMBOSACRAL REGION: ICD-10-CM

## 2024-01-17 PROCEDURE — 99214 OFFICE O/P EST MOD 30 MIN: CPT | Mod: 25

## 2024-01-17 PROCEDURE — 20552 NJX 1/MLT TRIGGER POINT 1/2: CPT

## 2024-01-17 PROCEDURE — J3490M: CUSTOM | Mod: NC

## 2024-01-17 NOTE — HISTORY OF PRESENT ILLNESS
[Neck] : neck [Lower back] : lower back [2] : 2 [Dull/Aching] : dull/aching [Radiating] : radiating [Tingling] : tingling [Intermittent] : intermittent [Injection therapy] : injection therapy [Standing] : standing [Walking] : walking [Bending forward] : bending forward [FreeTextEntry1] : 6/13/23- R L34 L45 TFESI >80% relief pp with improved ROM and ADLS Sustained several months   [] : no [FreeTextEntry6] : NUMBNESS [FreeTextEntry7] : right arm and right leg

## 2024-01-17 NOTE — ASSESSMENT
[FreeTextEntry1] : 6/13/23- R L34 L45 TFESI >80% relief pp with improved ROM and ADLS Sustained several months  Pt. presents with >80% Relief of pain and improvement in ROM and ADLS post injection.  Able to sit, stand, walk, sleep for longer periods of time.  c/o pain in neck on R side with radiation into RUE with intermittent paresthesias. States that TPI provide >70% relief of these symptoms  pain in lower back and R leg with paresthesias

## 2024-01-17 NOTE — DISCUSSION/SUMMARY
[de-identified] : TPI Today cervical  proceed R L34 45 TFESI  After discussing various treatment options with the patient including but not limited to oral medications, physical therapy, exercise, modalities as well as interventional spinal injections, we have decided with the following plan: I personally reviewed the MRI/CT scan images and agree with the radiologist's report. The radiological findings were discussed with the patient. The risks, benefits, contents and alternatives to injection were explained in full to the patient. Risks outlined include but are not limited to infection,sepsis, bleeding, post-dural puncture headache, nerve damage, temporary increase in pain, syncopal episode, failure to resolve symptoms, allergic reaction, symptom recurrence, and elevation of blood sugar in diabetics. Cortisone may cause immunosuppression. Patient understands the risks. All questions were answered. After discussion of options, patient requested an injection. Information regarding the injection was given to the patient. Which medications to stop prior to the injection was explained to the patient as well. Follow up in 1-2 weeks post injection for re-evaluation. Continue Home exercises, stretching, activity modification, physical therapy, and conservative care. Patient is presenting with acute/sub-acute radicular pain with impairment in ADLs and functionality. The pain has not responded to conservative care including nsaid therapy and/or physical therapy. There is no bleeding tendency, unstable medical condition, or systemic infection.
Quality 110: Preventive Care And Screening: Influenza Immunization: Influenza Immunization Administered during Influenza season
Quality 111:Pneumonia Vaccination Status For Older Adults: Patient received any pneumococcal conjugate or polysaccharide vaccine on or after their 60th birthday and before the end of the measurement period
Detail Level: Detailed

## 2024-01-17 NOTE — PHYSICAL EXAM
[de-identified] : PHYSICAL EXAM  Constitutional:  Appears well, no apparent distress Ability to communicate: Normal Respiratory: non-labored breathing Skin: no rash noted Head: normocephalic, atraumatic Neck: no visible thyroid enlargement Eyes: extraocular movements intact Neurologic: alert and oriented x3 Psychiatric: normal mood, affect, and behavior  Lumbar Spine:  Palpation: right lumbar paraspinal spasm and right lumbar paraspinal tenderness to palpation. ROM: Diminished range of motion in all plains.  Patient notes pain with lateral bending to the right. MMT: Motor exam is 5/5 through out bilateral lower extremities. Sensation: Light touch and pain is intact throughout bilateral lower extremities. Reflexes: achilles and patella reflexes are intact and  symmetrical.  No sustained clonus. Special Testing: Positive straight leg raise on the right side.  Assessemnt: Radiculopathy of lumbosacral region (M54.17) Myalgia (M79.10)  Plan: After discussing various treatment options with the patient including but not limited to oral medications, physical therapy, exercise modalities as well as interventional spinal injections, we have decided with the following plan: The patient is presenting with acute/sub-acute radicular pain with impairment in ADLs and functionality.  The pain has not responded to conservative care including NSAID therapy and/or physical therapy.  There is no bleeding tendency, unstable medical condition, or systemic infection  The risks, benefits and alternatives of the proposed procedure were explained in detail with the patient.  The risks outlined include but are not limited to infection, bleeding, post dural puncture headache, nerve injury, a temporary increase in pain, failure to resolve symptoms, allergic reaction, symptom recurrence, and possible elevation of blood sugar.  All questions were answered to patient's satisfaction and he/she verbalized an understanding.  Follow up 1-2 weeks post injection foe re-evaluation.  Continue home exercises, stretching, activity modification, physical therapy, and conservative care.  PHYSICAL EXAM  Constitutional:  Appears well, no apparent distress Ability to communicate: Normal Respiratory: non-labored breathing Skin: no rash noted Head: normocephalic, atraumatic Neck: no visible thyroid enlargement Eyes: extraocular movements intact Neurologic: alert and oriented x3 Psychiatric: normal mood, affect, and behavior  Cervical: Palpation: right trapezial spasm, right trapezius tenderness and right paracervical tenderness ROM: Diminished range of motion in all plains.  Patient notes pain at extremes of extension and rotation to right.  Stiffness at extremes of extension and rotation to the right MMT: Motor exam is 5/5 through out bilateral upper extremities. Sensation: Light touch and pain is intact throughout bilateral upper extremities. Reflexes: No sustained clonus. Special Testing: Positive right Spurling test   Assessemnt: Radiculopathy of cervical region (M54.12) Cervicalgia (M54.2)   Plan: After discussing various treatment options with the patient including but not limited to oral medications, physical therapy, exercise modalities as well as interventional spinal injections, we have decided with the following plan:  MRI Review  I personally reviewed the MRI/CT scan images and agree with the radiologist's report.  The radiological findings were discussed with the patient.     .  The risks, benefits and alternatives of the proposed procedure were explained in detail with the patient.  The risks outlined include but are not limited to infection, bleeding, post dural puncture headache, nerve injury, a temporary increase in pain, failure to resolve symptoms, allergic reaction, symptom recurrence, and possible elevation of blood sugar.  All questions were answered to patient's satisfaction and he/she verbalized an understanding.  Follow up 1-2 weeks post injection for re-evaluation.  Continue home exercises, stretching, activity modification, physical therapy, and conservative care.  PHYSICAL EXAM  Constitutional:  Appears well, no apparent distress Ability to communicate: Normal Respiratory: non-labored breathing Skin: no rash noted Head: normocephalic, atraumatic Neck: no visible thyroid enlargement Eyes: extraocular movements intact Neurologic: alert and oriented x3 Psychiatric: normal mood, affect, and behavior  Cervical: Palpation: right trapezial spasm, right trapezius tenderness and right paracervical tenderness ROM: Diminished range of motion in all plains.  Patient notes pain at extremes of extension and rotation to right.  Stiffness at extremes of extension and rotation to the right MMT: Motor exam is 5/5 through out bilateral upper extremities. Sensation: Light touch and pain is intact throughout bilateral upper extremities. Reflexes: No sustained clonus. Special Testing: Positive right Spurling test   Assessemnt: Radiculopathy of cervical region (M54.12) Cervicalgia (M54.2)   Plan: After discussing various treatment options with the patient including but not limited to oral medications, physical therapy, exercise modalities as well as interventional spinal injections, we have decided with the following plan:  MRI Review  I personally reviewed the MRI/CT scan images and agree with the radiologist's report.  The radiological findings were discussed with the patient.     .  The risks, benefits and alternatives of the proposed procedure were explained in detail with the patient.  The risks outlined include but are not limited to infection, bleeding, post dural puncture headache, nerve injury, a temporary increase in pain, failure to resolve symptoms, allergic reaction, symptom recurrence, and possible elevation of blood sugar.  All questions were answered to patient's satisfaction and he/she verbalized an understanding.  Follow up 1-2 weeks post injection for re-evaluation.  Continue home exercises, stretching, activity modification, physical therapy, and conservative care.   [Rotation to right] : rotation to right [] : positive Spurling

## 2024-01-30 ENCOUNTER — APPOINTMENT (OUTPATIENT)
Age: 84
End: 2024-01-30
Payer: MEDICARE

## 2024-01-30 PROCEDURE — 64484 NJX AA&/STRD TFRM EPI L/S EA: CPT | Mod: 59,RT

## 2024-01-30 PROCEDURE — 64483 NJX AA&/STRD TFRM EPI L/S 1: CPT | Mod: RT

## 2024-02-15 ENCOUNTER — APPOINTMENT (OUTPATIENT)
Dept: PAIN MANAGEMENT | Facility: CLINIC | Age: 84
End: 2024-02-15

## 2024-03-21 NOTE — OCCUPATIONAL THERAPY INITIAL EVALUATION ADULT - SENSATION HOT/COLD, HEAD/NECK, OT EVAL
----- Message from Benito Verdin MD sent at 3/21/2024  8:22 AM CDT -----  Please let the patient know that his labs show that his HBA1C (diabetic control) is stable, remains above goal. I recommend starting Jardiance 10mg once daily.   TG has slightly worsened, I suggest he keeps a healthy diet and regular exercise regimen.   PSA is WNL, CBC WNL.    within normal limits

## 2024-11-01 NOTE — OCCUPATIONAL THERAPY INITIAL EVALUATION ADULT - LEVEL OF INDEPENDENCE: SIT/SUPINE, REHAB EVAL
step daughter/Patient/Caregiver requests family/friend to interpret.
minimum assist (75% patients effort)

## 2024-11-20 ENCOUNTER — DOCTOR'S OFFICE (OUTPATIENT)
Facility: LOCATION | Age: 84
Setting detail: OPHTHALMOLOGY
End: 2024-11-20
Payer: MEDICARE

## 2024-11-20 DIAGNOSIS — H11.153: ICD-10-CM

## 2024-11-20 DIAGNOSIS — B97.7: ICD-10-CM

## 2024-11-20 DIAGNOSIS — H53.002: ICD-10-CM

## 2024-11-20 DIAGNOSIS — H53.413: ICD-10-CM

## 2024-11-20 DIAGNOSIS — H02.31: ICD-10-CM

## 2024-11-20 DIAGNOSIS — Z96.1: ICD-10-CM

## 2024-11-20 DIAGNOSIS — H02.34: ICD-10-CM

## 2024-11-20 DIAGNOSIS — H18.413: ICD-10-CM

## 2024-11-20 PROCEDURE — 92014 COMPRE OPH EXAM EST PT 1/>: CPT | Performed by: OPHTHALMOLOGY

## 2024-11-20 ASSESSMENT — VISUAL ACUITY
OD_BCVA: 20/60+2
OS_BCVA: 20/30

## 2024-11-20 ASSESSMENT — LID EXAM ASSESSMENTS
OS_COMMENTS: BLEPHAROCHALASIS
OD_COMMENTS: BLEPHAROCHALASIS

## 2024-11-20 ASSESSMENT — REFRACTION_AUTOREFRACTION
OD_SPHERE: +1.50
OD_AXIS: 032
OS_SPHERE: +1.25
OS_AXIS: 006
OD_CYLINDER: +0.50
OS_CYLINDER: +1.50

## 2024-11-20 ASSESSMENT — REFRACTION_CURRENTRX
OD_ADD: +3.25
OS_OVR_VA: 20/
OD_CYLINDER: +0.25
OD_AXIS: 017
OS_VPRISM_DIRECTION: PROGS
OD_OVR_VA: 20/
OS_CYLINDER: +1.25
OD_VPRISM_DIRECTION: PROGS
OS_SPHERE: +0.25
OS_ADD: +3.25
OS_AXIS: 120
OD_SPHERE: +1.25

## 2024-11-20 ASSESSMENT — REFRACTION_MANIFEST
OS_SPHERE: PLANO
OD_ADD: +3.50
OD_CYLINDER: +1.00
OD_VA1: 20/20
OD_ADD: +3.50
OD_VA1: 20/20
OS_ADD: +3.50
OD_CYLINDER: +0.50
OD_SPHERE: +1.25
OD_AXIS: 030
OD_CYLINDER: +0.50
OS_VA1: 20/60-
OS_CYLINDER: +1.25
OD_ADD: +3.25
OS_AXIS: 130
OS_SPHERE: +1.00
OD_SPHERE: +1.25
OS_SPHERE: +0.50
OD_AXIS: 030
OD_AXIS: 030
OS_AXIS: 125
OS_VA1: 20/40
OS_VA1: 20/60
OS_AXIS: 115
OS_ADD: +3.50
OD_SPHERE: PLANO
OS_ADD: +3.25
OS_CYLINDER: +0.75
OS_CYLINDER: +1.50

## 2024-11-20 ASSESSMENT — TONOMETRY
OD_IOP_MMHG: 10
OS_IOP_MMHG: 09

## 2024-11-20 ASSESSMENT — KERATOMETRY
OS_AXISANGLE_DEGREES: 160
METHOD_AUTO_MANUAL: AUTO
OD_AXISANGLE_DEGREES: 005
OS_K1POWER_DIOPTERS: 42.50
OS_K2POWER_DIOPTERS: 43.50
OD_K2POWER_DIOPTERS: 43.50
OD_K1POWER_DIOPTERS: 42.25

## 2024-11-20 ASSESSMENT — LID POSITION - COMMENTS
OS_COMMENTS: PUNCTAL
OD_COMMENTS: PUNCTAL

## 2024-11-20 ASSESSMENT — LID POSITION - ECTROPION
OD_ECTROPION: RLL T 1+
OS_ECTROPION: LLL T 1+

## 2024-11-20 ASSESSMENT — CONFRONTATIONAL VISUAL FIELD TEST (CVF)
OD_FINDINGS: FULL
OS_FINDINGS: FULL

## 2025-01-31 NOTE — PHYSICAL THERAPY INITIAL EVALUATION ADULT - STRENGTHENING, PT EVAL
[facility]
No
Improve strength in the R LE and be able to perform functional tasks-bed mobility, sitting, standing, transfers and ambulate in a safe manner with or without  assistive device and prevent falls.

## 2025-07-28 NOTE — OCCUPATIONAL THERAPY INITIAL EVALUATION ADULT - KINESTHESIA, HEAD/NECK, OT EVAL
Signed Prescriptions:                        Disp   Refills    finasteride (PROSCAR) 5 MG tablet          90 tab*0        Sig: Take 1 tablet (5 mg) by mouth daily.  Authorizing Provider: BRIE ALDANA  Ordering User: GREGG COX    My Chart message sent to patient.    Mayda FERRER RN Urology 7/28/2025 9:12 AM     Imaging Studies/Medications/Labs within normal limits